# Patient Record
Sex: FEMALE | Race: WHITE | NOT HISPANIC OR LATINO | Employment: UNEMPLOYED | URBAN - METROPOLITAN AREA
[De-identification: names, ages, dates, MRNs, and addresses within clinical notes are randomized per-mention and may not be internally consistent; named-entity substitution may affect disease eponyms.]

---

## 2017-01-02 ENCOUNTER — OFFICE VISIT (OUTPATIENT)
Dept: FAMILY MEDICINE | Facility: CLINIC | Age: 32
End: 2017-01-02
Payer: COMMERCIAL

## 2017-01-02 VITALS
WEIGHT: 146 LBS | BODY MASS INDEX: 21.62 KG/M2 | HEART RATE: 86 BPM | RESPIRATION RATE: 16 BRPM | SYSTOLIC BLOOD PRESSURE: 104 MMHG | HEIGHT: 69 IN | DIASTOLIC BLOOD PRESSURE: 69 MMHG | TEMPERATURE: 97.8 F

## 2017-01-02 DIAGNOSIS — Z00.00 ROUTINE GENERAL MEDICAL EXAMINATION AT A HEALTH CARE FACILITY: Primary | ICD-10-CM

## 2017-01-02 LAB
ALBUMIN SERPL-MCNC: 3.9 G/DL (ref 3.4–5)
ALP SERPL-CCNC: 45 U/L (ref 40–150)
ALT SERPL W P-5'-P-CCNC: 23 U/L (ref 0–50)
ANION GAP SERPL CALCULATED.3IONS-SCNC: 5 MMOL/L (ref 3–14)
AST SERPL W P-5'-P-CCNC: 11 U/L (ref 0–45)
BASOPHILS # BLD AUTO: 0 10E9/L (ref 0–0.2)
BASOPHILS NFR BLD AUTO: 0.3 %
BILIRUB SERPL-MCNC: 0.7 MG/DL (ref 0.2–1.3)
BUN SERPL-MCNC: 14 MG/DL (ref 7–30)
CALCIUM SERPL-MCNC: 8.5 MG/DL (ref 8.5–10.1)
CHLORIDE SERPL-SCNC: 108 MMOL/L (ref 94–109)
CO2 SERPL-SCNC: 28 MMOL/L (ref 20–32)
CREAT SERPL-MCNC: 0.9 MG/DL (ref 0.52–1.04)
DIFFERENTIAL METHOD BLD: ABNORMAL
EOSINOPHIL # BLD AUTO: 0.1 10E9/L (ref 0–0.7)
EOSINOPHIL NFR BLD AUTO: 2.8 %
ERYTHROCYTE [DISTWIDTH] IN BLOOD BY AUTOMATED COUNT: 13.2 % (ref 10–15)
GFR SERPL CREATININE-BSD FRML MDRD: 72 ML/MIN/1.7M2
GLUCOSE SERPL-MCNC: 91 MG/DL (ref 70–99)
HCT VFR BLD AUTO: 37.3 % (ref 35–47)
HGB BLD-MCNC: 12.3 G/DL (ref 11.7–15.7)
LYMPHOCYTES # BLD AUTO: 0.9 10E9/L (ref 0.8–5.3)
LYMPHOCYTES NFR BLD AUTO: 26.5 %
MCH RBC QN AUTO: 28.9 PG (ref 26.5–33)
MCHC RBC AUTO-ENTMCNC: 33 G/DL (ref 31.5–36.5)
MCV RBC AUTO: 88 FL (ref 78–100)
MONOCYTES # BLD AUTO: 0.3 10E9/L (ref 0–1.3)
MONOCYTES NFR BLD AUTO: 9.4 %
NEUTROPHILS # BLD AUTO: 2.1 10E9/L (ref 1.6–8.3)
NEUTROPHILS NFR BLD AUTO: 61 %
PLATELET # BLD AUTO: 127 10E9/L (ref 150–450)
POTASSIUM SERPL-SCNC: 4 MMOL/L (ref 3.4–5.3)
PROT SERPL-MCNC: 6.9 G/DL (ref 6.8–8.8)
RBC # BLD AUTO: 4.26 10E12/L (ref 3.8–5.2)
SODIUM SERPL-SCNC: 141 MMOL/L (ref 133–144)
WBC # BLD AUTO: 3.5 10E9/L (ref 4–11)

## 2017-01-02 PROCEDURE — 80053 COMPREHEN METABOLIC PANEL: CPT | Performed by: FAMILY MEDICINE

## 2017-01-02 PROCEDURE — 87624 HPV HI-RISK TYP POOLED RSLT: CPT | Mod: 90 | Performed by: FAMILY MEDICINE

## 2017-01-02 PROCEDURE — 36415 COLL VENOUS BLD VENIPUNCTURE: CPT | Performed by: FAMILY MEDICINE

## 2017-01-02 PROCEDURE — 99000 SPECIMEN HANDLING OFFICE-LAB: CPT | Performed by: FAMILY MEDICINE

## 2017-01-02 PROCEDURE — 85025 COMPLETE CBC W/AUTO DIFF WBC: CPT | Performed by: FAMILY MEDICINE

## 2017-01-02 PROCEDURE — 99395 PREV VISIT EST AGE 18-39: CPT | Performed by: FAMILY MEDICINE

## 2017-01-02 PROCEDURE — G0145 SCR C/V CYTO,THINLAYER,RESCR: HCPCS | Mod: 90 | Performed by: FAMILY MEDICINE

## 2017-01-02 NOTE — PROGRESS NOTES
SUBJECTIVE:     CC: Lin Knight is an 31 year old woman who presents for preventive health visit.     Healthy Habits:    Do you get at least three servings of calcium containing foods daily (dairy, green leafy vegetables, etc.)? yes    Amount of exercise or daily activities, outside of work: 1 day(s) per week    Problems taking medications regularly not applicable    Medication side effects: No    Have you had an eye exam in the past two years? yes    Do you see a dentist twice per year? 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    No results for input(s): CHOL, HDL, LDL, TRIG, CHOLHDLRATIO, NHDL in the last 58433 hours.    Reviewed orders with patient.  Reviewed health maintenance and updated orders accordingly - Yes    Mammo Decision Support:  Mammogram not appropriate for this patient based on age.    Pertinent mammograms are reviewed under the imaging tab.  History of abnormal Pap smear: NO - age 30- 65 PAP every 3 years recommended  All Histories reviewed and updated in Epic.      ROS:  C: NEGATIVE for fever, chills, change in weight  I: NEGATIVE for worrisome rashes, moles or lesions  E: NEGATIVE for vision changes or irritation  ENT: NEGATIVE for ear, mouth and throat problems  R: NEGATIVE for significant cough or SOB  B: NEGATIVE for masses, tenderness or discharge  CV: NEGATIVE for chest  "pain, palpitations or peripheral edema  GI: NEGATIVE for nausea, abdominal pain, heartburn, or change in bowel habits  : NEGATIVE for unusual urinary or vaginal symptoms. Periods are regular.  M: NEGATIVE for significant arthralgias or myalgia  N: NEGATIVE for weakness, dizziness or paresthesias  P: NEGATIVE for changes in mood or affect    Problem list, Medication list, Allergies, and Medical/Social/Surgical histories reviewed in [facility] and updated as appropriate.  OBJECTIVE:     /69 mmHg  Pulse 86  Temp(Src) 97.8  F (36.6  C) (Oral)  Resp 16  Ht 5' 9\" (1.753 m)  Wt 146 lb (66.225 kg)  BMI 21.55 kg/m2  Breastfeeding? No  EXAM:  GENERAL: healthy, alert and no distress  EYES: Eyes grossly normal to inspection, PERRL and conjunctivae and sclerae normal  HENT: ear canals and TM's normal, nose and mouth without ulcers or lesions  NECK: no adenopathy, no asymmetry, masses, or scars and thyroid normal to palpation  RESP: lungs clear to auscultation - no rales, rhonchi or wheezes  BREAST: normal without masses, tenderness or nipple discharge and no palpable axillary masses or adenopathy  CV: regular rate and rhythm, normal S1 S2, no S3 or S4, no murmur, click or rub, no peripheral edema and peripheral pulses strong  ABDOMEN: soft, nontender, no hepatosplenomegaly, no masses and bowel sounds normal   (female): normal female external genitalia, normal urethral meatus , vaginal mucosa pink, moist, well rugated, vaginal discharge - bloody (patient on her period) and normal cervix, adnexae, and uterus without masses.  MS: no gross musculoskeletal defects noted, no edema  SKIN: no suspicious lesions or rashes  NEURO: Normal strength and tone, mentation intact and speech normal  PSYCH: mentation appears normal, affect normal/bright    ASSESSMENT/PLAN:     1. Routine general medical examination at a health care facility  - pap done today.   - CBC with platelets differential  - Comprehensive metabolic panel  - Pap " "imaged thin layer screen with HPV - recommended age 30 - 65 years (select HPV order below)    COUNSELING:   Reviewed preventive health counseling, as reflected in patient instructions       Regular exercise       Healthy diet/nutrition         reports that she has never smoked. She has never used smokeless tobacco.    Estimated body mass index is 21.55 kg/(m^2) as calculated from the following:    Height as of this encounter: 5' 9\" (1.753 m).    Weight as of this encounter: 146 lb (66.225 kg).       Counseling Resources:  ATP IV Guidelines  Pooled Cohorts Equation Calculator  Breast Cancer Risk Calculator  FRAX Risk Assessment  ICSI Preventive Guidelines  Dietary Guidelines for Americans, 2010  USDA's MyPlate  ASA Prophylaxis  Lung CA Screening    Barbara Díaz MD  [facility]  "

## 2017-01-02 NOTE — MR AVS SNAPSHOT
After Visit Summary   1/2/2017    Lin Knight    MRN: [id number]           Patient Information     Date Of Birth          1985        Visit Information        Provider Department      1/2/2017 8:00 AM Barbara Díaz MD [facility]        Today's Diagnoses     Routine general medical examination at a health care facility    -  1       Care Instructions      Preventive Health Recommendations  Female Ages 26 - 39  Yearly exam:   See your health care provider every year in order to    Review health changes.     Discuss preventive care.      Review your medicines if you your doctor has prescribed any.    Until age 30: Get a Pap test every three years (more often if you have had an abnormal result).    After age 30: Talk to your doctor about whether you should have a Pap test every 3 years or have a Pap test with HPV screening every 5 years.   You do not need a Pap test if your uterus was removed (hysterectomy) and you have not had cancer.  You should be tested each year for STDs (sexually transmitted diseases), if you're at risk.   Talk to your provider about how often to have your cholesterol checked.  If you are at risk for diabetes, you should have a diabetes test (fasting glucose).  Shots: Get a flu shot each year. Get a tetanus shot every 10 years.   Nutrition:     Eat at least 5 servings of fruits and vegetables each day.    Eat whole-grain bread, whole-wheat pasta and brown rice instead of white grains and rice.    Talk to your provider about Calcium and Vitamin D.     Lifestyle    Exercise at least 150 minutes a week (30 minutes a day, 5 days of the week). This will help you control your weight and prevent disease.    Limit alcohol to one drink per day.    No smoking.     Wear sunscreen to prevent skin cancer.    See your dentist every six months for an exam and cleaning.            Follow-ups after your visit        Your next 10 appointments already  "scheduled     2017  3:30 PM   SHORT with Darius Mckeon MD   [facility]    [address] [address] [phone]              Who to contact     If you have questions or need follow up information about today's clinic visit or your schedule please contact [facility] directly at [phone].  Normal or non-critical lab and imaging results will be communicated to you by MyChart, letter or phone within 4 business days after the clinic has received the results. If you do not hear from us within 7 days, please contact the clinic through MyChart or phone. If you have a critical or abnormal lab result, we will notify you by phone as soon as possible.  Submit refill requests through [employer] or call your pharmacy and they will forward the refill request to us. Please allow 3 business days for your refill to be completed.          Additional Information About Your Visit        [employer] Information     [employer] lets you send messages to your doctor, view your test results, renew your prescriptions, schedule appointments and more. To sign up, go to [email or web address] . Click on \"Log in\" on the left side of the screen, which will take you to the Welcome page. Then click on \"Sign up Now\" on the right side of the page.     You will be asked to enter the access code listed below, as well as some personal information. Please follow the directions to create your username and password.     Your access code is: [email or web address]  Expires: 2017  8:44 AM     Your access code will  in 90 days. If you need help or a new code, please call your [facility] or [phone].        Your Vitals Were     Pulse Temperature Respirations Height BMI (Body Mass Index) Breastfeeding?    86 97.8  F (36.6  C) (Oral) 16 5' 9\" (1.753 m) 21.55 kg/m2 No       Blood Pressure from Last 3 Encounters:   17 104/69   14 " 101/55   03/12/13 91/60    Weight from Last 3 Encounters:   01/02/17 146 lb (66.225 kg)   06/05/14 141 lb 1.6 oz (64.003 kg)   03/12/13 143 lb (64.864 kg)              We Performed the Following     CBC with platelets differential     Comprehensive metabolic panel        Primary Care Provider    None Specified       No primary provider on file.        Thank you!     Thank you for choosing [facility]  for your care. Our goal is always to provide you with excellent care. Hearing back from our patients is one way we can continue to improve our services. Please take a few minutes to complete the written survey that you may receive in the mail after your visit with us. Thank you!             Your Updated Medication List - Protect others around you: Learn how to safely use, store and throw away your medicines at www.disposemymeds.org.      Notice  As of 1/2/2017  8:44 AM    You have not been prescribed any medications.

## 2017-01-02 NOTE — Clinical Note
[facility]  [address] [place], [place], [address]  [phone]      January 5, 2017    Lin Knight  [address]   [place] [address]          Dear Lin,    The results of your recent tests were normal.All your labs from recent visit are within normal limits.  It was a pleasure to serve you.         All the best in [place]      If you have any questions or concerns, please call myself or my nurse at [phone].          Sincerely,    Barbara Díaz MD

## 2017-01-02 NOTE — Clinical Note
[facility]  [address] [address]  [phone]      January 9, 2017    Lin Knight  [address]   [place] [facility] [address]    Dear Lin,  We are happy to inform you that your PAP smear result from 01/02/17 is normal.  We are now able to do a follow up test on PAP smears. The DNA test is for HPV (Human Papilloma Virus). Cervical cancer is closely linked with certain types of HPV. Your result showed no evidence of high risk HPV.  Therefore we recommend you return in 3 years for your next pap smear.  You will still need to return to the clinic every year for an annual exam and other preventive tests.  Please contact the clinic with any questions.  Sincerely,  Barbara Díaz MD/[facility]

## 2017-01-04 LAB
COPATH REPORT: NORMAL
PAP: NORMAL

## 2017-01-05 NOTE — PROGRESS NOTES
Quick Note:    Please send patient a letter to let them know results are normal.    All your labs from recent visit are within normal limits.   It was a pleasure to serve you.   All the best in [place].     Sincerely,    Dr. Vigil  ______

## 2017-01-06 LAB
FINAL DIAGNOSIS: NORMAL
HPV HR 12 DNA CVX QL NAA+PROBE: NEGATIVE
HPV16 DNA SPEC QL NAA+PROBE: NEGATIVE
HPV18 DNA SPEC QL NAA+PROBE: NEGATIVE
SPECIMEN DESCRIPTION: NORMAL

## 2017-06-02 NOTE — NURSING NOTE
"Chief Complaint   Patient presents with     Physical     PE/PAP---pt is fasting this AM     Initial /69 mmHg  Pulse 86  Temp(Src) 97.8  F (36.6  C) (Oral)  Resp 16  Ht 5' 9\" (1.753 m)  Wt 146 lb (66.225 kg)  BMI 21.55 kg/m2  Breastfeeding? No Estimated body mass index is 21.55 kg/(m^2) as calculated from the following:    Height as of this encounter: 5' 9\" (1.753 m).    Weight as of this encounter: 146 lb (66.225 kg)..  BP completed using cuff size regular.            Devika Aly/JOON    " ineffective pain medication

## 2018-09-10 ENCOUNTER — PRENATAL OFFICE VISIT - HEALTHEAST (OUTPATIENT)
Dept: MIDWIFE SERVICES | Facility: CLINIC | Age: 33
End: 2018-09-10

## 2018-09-10 DIAGNOSIS — O09.30 LATE PRENATAL CARE AFFECTING PREGNANCY, ANTEPARTUM: ICD-10-CM

## 2018-09-10 DIAGNOSIS — Z34.83 ENCOUNTER FOR SUPERVISION OF OTHER NORMAL PREGNANCY IN THIRD TRIMESTER: ICD-10-CM

## 2018-09-10 LAB
ALBUMIN UR-MCNC: NEGATIVE MG/DL
APPEARANCE UR: CLEAR
BASOPHILS # BLD AUTO: 0 THOU/UL (ref 0–0.2)
BASOPHILS NFR BLD AUTO: 0 % (ref 0–2)
BILIRUB UR QL STRIP: NEGATIVE
COLOR UR AUTO: YELLOW
EOSINOPHIL # BLD AUTO: 0 THOU/UL (ref 0–0.4)
EOSINOPHIL NFR BLD AUTO: 1 % (ref 0–6)
ERYTHROCYTE [DISTWIDTH] IN BLOOD BY AUTOMATED COUNT: 14.3 % (ref 11–14.5)
FASTING STATUS PATIENT QL REPORTED: NO
GLUCOSE 1H P 50 G GLC PO SERPL-MCNC: 75 MG/DL (ref 70–139)
GLUCOSE UR STRIP-MCNC: NEGATIVE MG/DL
HCT VFR BLD AUTO: 35.2 % (ref 35–47)
HGB BLD-MCNC: 11.5 G/DL (ref 12–16)
HGB UR QL STRIP: NEGATIVE
HIV 1+2 AB+HIV1 P24 AG SERPL QL IA: NEGATIVE
KETONES UR STRIP-MCNC: NEGATIVE MG/DL
LEUKOCYTE ESTERASE UR QL STRIP: NEGATIVE
LYMPHOCYTES # BLD AUTO: 0.9 THOU/UL (ref 0.8–4.4)
LYMPHOCYTES NFR BLD AUTO: 15 % (ref 20–40)
MCH RBC QN AUTO: 29.5 PG (ref 27–34)
MCHC RBC AUTO-ENTMCNC: 32.7 G/DL (ref 32–36)
MCV RBC AUTO: 90 FL (ref 80–100)
MONOCYTES # BLD AUTO: 0.4 THOU/UL (ref 0–0.9)
MONOCYTES NFR BLD AUTO: 6 % (ref 2–10)
NEUTROPHILS # BLD AUTO: 4.5 THOU/UL (ref 2–7.7)
NEUTROPHILS NFR BLD AUTO: 77 % (ref 50–70)
NITRATE UR QL: NEGATIVE
PH UR STRIP: 7 [PH] (ref 5–8)
PLATELET # BLD AUTO: 140 THOU/UL (ref 140–440)
PMV BLD AUTO: 12.3 FL (ref 8.5–12.5)
RBC # BLD AUTO: 3.9 MILL/UL (ref 3.8–5.4)
SP GR UR STRIP: 1.01 (ref 1–1.03)
UROBILINOGEN UR STRIP-ACNC: NORMAL
WBC: 5.8 THOU/UL (ref 4–11)

## 2018-09-10 ASSESSMENT — MIFFLIN-ST. JEOR: SCORE: 1408.23

## 2018-09-11 LAB
ABO/RH(D): NORMAL
ABORH REPEAT: NORMAL
ANTIBODY SCREEN: NEGATIVE
BACTERIA SPEC CULT: NORMAL
COLLECTION METHOD: NORMAL
HBV SURFACE AG SERPL QL IA: NEGATIVE
HCV AB SERPL QL IA: NEGATIVE
LEAD BLD-MCNC: NORMAL UG/DL
LEAD RETEST: NO
RUBV IGG SERPL QL IA: POSITIVE
T PALLIDUM AB SER QL: NEGATIVE

## 2018-09-12 LAB
GUARDIAN FIRST NAME: NORMAL
GUARDIAN LAST NAME: NORMAL
HEALTH CARE PROVIDER CITY: NORMAL
HEALTH CARE PROVIDER NAME: NORMAL
HEALTH CARE PROVIDER PHONE: NORMAL
HEALTH CARE PROVIDER STATE: NORMAL
HEALTH CARE PROVIDER STREET ADDRESS: NORMAL
HEALTH CARE PROVIDER ZIP CODE: NORMAL
LEAD, B: <1 MCG/DL (ref 0–4.9)
PATIENT CITY: NORMAL
PATIENT COUNTY: NORMAL
PATIENT EMPLOYER: NORMAL
PATIENT ETHNICITY: NORMAL
PATIENT HOME PHONE: NORMAL
PATIENT OCCUPATION: NORMAL
PATIENT RACE: NORMAL
PATIENT STATE: NORMAL
PATIENT STREET ADDRESS: NORMAL
PATIENT ZIP CODE: NORMAL
SUBMITTING LABORATORY PHONE: NORMAL
VENOUS/CAPILLARY: NORMAL

## 2018-09-14 ENCOUNTER — HOSPITAL ENCOUNTER (OUTPATIENT)
Dept: ULTRASOUND IMAGING | Facility: CLINIC | Age: 33
Discharge: HOME | End: 2018-09-14
Attending: MIDWIFE

## 2018-09-14 DIAGNOSIS — Z34.83 ENCOUNTER FOR SUPERVISION OF OTHER NORMAL PREGNANCY IN THIRD TRIMESTER: ICD-10-CM

## 2018-09-24 ENCOUNTER — AMBULATORY - HEALTHEAST (OUTPATIENT)
Dept: MIDWIFE SERVICES | Facility: CLINIC | Age: 33
End: 2018-09-24

## 2018-09-24 ENCOUNTER — PRENATAL OFFICE VISIT - HEALTHEAST (OUTPATIENT)
Dept: MIDWIFE SERVICES | Facility: CLINIC | Age: 33
End: 2018-09-24

## 2018-09-24 DIAGNOSIS — Z67.91 RH NEGATIVE, ANTEPARTUM: ICD-10-CM

## 2018-09-24 DIAGNOSIS — O26.899 RH NEGATIVE, ANTEPARTUM: ICD-10-CM

## 2018-09-24 DIAGNOSIS — Z34.80 ENCOUNTER FOR SUPERVISION OF OTHER NORMAL PREGNANCY: ICD-10-CM

## 2018-09-24 ASSESSMENT — MIFFLIN-ST. JEOR: SCORE: 1412.76

## 2018-09-25 LAB — ANTIBODY SCREEN: NEGATIVE

## 2018-10-22 ENCOUNTER — PRENATAL OFFICE VISIT - HEALTHEAST (OUTPATIENT)
Dept: MIDWIFE SERVICES | Facility: CLINIC | Age: 33
End: 2018-10-22

## 2018-10-22 ENCOUNTER — COMMUNICATION - HEALTHEAST (OUTPATIENT)
Dept: MIDWIFE SERVICES | Facility: CLINIC | Age: 33
End: 2018-10-22

## 2018-10-22 DIAGNOSIS — O26.899 RH NEGATIVE, ANTEPARTUM: ICD-10-CM

## 2018-10-22 DIAGNOSIS — Z67.91 RH NEGATIVE, ANTEPARTUM: ICD-10-CM

## 2018-10-22 DIAGNOSIS — O09.30 LATE PRENATAL CARE AFFECTING PREGNANCY, ANTEPARTUM: ICD-10-CM

## 2018-10-22 DIAGNOSIS — Z34.80 SUPERVISION OF OTHER NORMAL PREGNANCY, ANTEPARTUM: ICD-10-CM

## 2018-10-22 ASSESSMENT — MIFFLIN-ST. JEOR: SCORE: 1439.98

## 2018-11-05 ENCOUNTER — PRENATAL OFFICE VISIT - HEALTHEAST (OUTPATIENT)
Dept: MIDWIFE SERVICES | Facility: CLINIC | Age: 33
End: 2018-11-05

## 2018-11-05 DIAGNOSIS — O09.30 LATE PRENATAL CARE AFFECTING PREGNANCY, ANTEPARTUM: ICD-10-CM

## 2018-11-05 DIAGNOSIS — Z67.91 RH NEGATIVE, ANTEPARTUM: ICD-10-CM

## 2018-11-05 DIAGNOSIS — O26.899 RH NEGATIVE, ANTEPARTUM: ICD-10-CM

## 2018-11-05 DIAGNOSIS — Z34.83 ENCOUNTER FOR SUPERVISION OF OTHER NORMAL PREGNANCY, THIRD TRIMESTER: ICD-10-CM

## 2018-11-05 DIAGNOSIS — H61.21 IMPACTED CERUMEN OF RIGHT EAR: ICD-10-CM

## 2018-11-05 ASSESSMENT — MIFFLIN-ST. JEOR: SCORE: 1444.52

## 2018-11-07 LAB
ALLERGIC TO PENICILLIN: YES
GP B STREP DNA SPEC QL NAA+PROBE: NEGATIVE

## 2018-11-12 ENCOUNTER — PRENATAL OFFICE VISIT - HEALTHEAST (OUTPATIENT)
Dept: MIDWIFE SERVICES | Facility: CLINIC | Age: 33
End: 2018-11-12

## 2018-11-12 DIAGNOSIS — O09.529 SUPERVISION OF HIGH-RISK PREGNANCY OF ELDERLY MULTIGRAVIDA: ICD-10-CM

## 2018-11-12 DIAGNOSIS — O99.013 ANEMIA AFFECTING PREGNANCY IN THIRD TRIMESTER: ICD-10-CM

## 2018-11-12 LAB — HGB BLD-MCNC: 10.2 G/DL (ref 12–16)

## 2018-11-12 ASSESSMENT — MIFFLIN-ST. JEOR: SCORE: 1449.05

## 2018-11-13 ENCOUNTER — COMMUNICATION - HEALTHEAST (OUTPATIENT)
Dept: MIDWIFE SERVICES | Facility: CLINIC | Age: 33
End: 2018-11-13

## 2018-11-19 ENCOUNTER — PRENATAL OFFICE VISIT - HEALTHEAST (OUTPATIENT)
Dept: MIDWIFE SERVICES | Facility: CLINIC | Age: 33
End: 2018-11-19

## 2018-11-19 DIAGNOSIS — O26.899 RH NEGATIVE, ANTEPARTUM: ICD-10-CM

## 2018-11-19 DIAGNOSIS — Z67.91 RH NEGATIVE, ANTEPARTUM: ICD-10-CM

## 2018-11-19 DIAGNOSIS — Z34.80 SUPERVISION OF OTHER NORMAL PREGNANCY, ANTEPARTUM: ICD-10-CM

## 2018-11-19 DIAGNOSIS — O09.30 LATE PRENATAL CARE AFFECTING PREGNANCY, ANTEPARTUM: ICD-10-CM

## 2018-11-19 ASSESSMENT — MIFFLIN-ST. JEOR: SCORE: 1444.52

## 2018-11-26 ENCOUNTER — PRENATAL OFFICE VISIT - HEALTHEAST (OUTPATIENT)
Dept: MIDWIFE SERVICES | Facility: CLINIC | Age: 33
End: 2018-11-26

## 2018-11-26 DIAGNOSIS — Z34.80 SUPERVISION OF OTHER NORMAL PREGNANCY, ANTEPARTUM: ICD-10-CM

## 2018-11-26 ASSESSMENT — MIFFLIN-ST. JEOR: SCORE: 1453.59

## 2018-12-03 ENCOUNTER — PRENATAL OFFICE VISIT - HEALTHEAST (OUTPATIENT)
Dept: MIDWIFE SERVICES | Facility: CLINIC | Age: 33
End: 2018-12-03

## 2018-12-03 DIAGNOSIS — Z34.80 SUPERVISION OF OTHER NORMAL PREGNANCY, ANTEPARTUM: ICD-10-CM

## 2018-12-03 ASSESSMENT — MIFFLIN-ST. JEOR: SCORE: 1453.59

## 2019-01-21 ENCOUNTER — OFFICE VISIT - HEALTHEAST (OUTPATIENT)
Dept: MIDWIFE SERVICES | Facility: CLINIC | Age: 34
End: 2019-01-21

## 2019-01-21 ASSESSMENT — MIFFLIN-ST. JEOR: SCORE: 1400.29

## 2019-02-07 ENCOUNTER — COMMUNICATION - HEALTHEAST (OUTPATIENT)
Dept: MIDWIFE SERVICES | Facility: CLINIC | Age: 34
End: 2019-02-07

## 2019-04-29 ENCOUNTER — OFFICE VISIT - HEALTHEAST (OUTPATIENT)
Dept: MIDWIFE SERVICES | Facility: CLINIC | Age: 34
End: 2019-04-29

## 2019-04-29 DIAGNOSIS — R63.4 UNEXPLAINED WEIGHT LOSS: ICD-10-CM

## 2019-04-29 DIAGNOSIS — Z01.419 WELL WOMAN EXAM WITH ROUTINE GYNECOLOGICAL EXAM: ICD-10-CM

## 2019-04-29 ASSESSMENT — MIFFLIN-ST. JEOR: SCORE: 1443.95

## 2019-07-10 ENCOUNTER — TELEPHONE (OUTPATIENT)
Dept: SCHEDULING | Facility: CLINIC | Age: 34
End: 2019-07-10

## 2020-06-25 ENCOUNTER — COMMUNICATION - HEALTHEAST (OUTPATIENT)
Dept: MIDWIFE SERVICES | Facility: CLINIC | Age: 35
End: 2020-06-25

## 2021-05-28 ENCOUNTER — COMMUNICATION - HEALTHEAST (OUTPATIENT)
Dept: MIDWIFE SERVICES | Facility: CLINIC | Age: 36
End: 2021-05-28

## 2021-05-28 NOTE — PROGRESS NOTES
Annual Health Maintenance Exam    Subjective:     Lin is a 33 y.o. female who presents for an annual exam.  She is an established patient to the [facility]. She is a . Her son Mati is 5 months old.  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  They will likely stay there for 2 more years.  She reports no concerns since her last visit which was 6 weeks postpartum.  She is breastfeeding exclusively, and has not had a return of her menses since delivery.  She is using NFP for birth control purposes.  She is unsure about future children/pregnancies.  She is due for pap smear screening today, however has many questions about HPV testing, and feels she does not need the testing for HPV as she is low risk and has not had any change in partners. She is currently in a monogamous relationship with her .  She has never had a history of abnormal pap smears, and reports no history of STI's.  She would like to have only cervical cytology performed today, and agrees to have HPV testing occur if cytology is abnormal.   She is exercising 1-2 times a week, walking with her children.  She does report a history of unexplained weight loss of over 30 lbs when living in [place] [place].  She is concerned that her thyroid is causing this.  She notes that the past 9 months have not been an issue as she was pregnant and postpartum.  She has not noted the weight loss issues while living in the [place], only when living in [place].  No other family members have this issue.  Denies changes in stool/BM habits or consistency, no abdominal pain or nausea/vomiting.    Allergies  Patient has no known allergies.    Current Medications  Current Outpatient Medications   Medication Sig Dispense Refill     prenatal 21/iron fu/folic acid (PRENATAL COMPLETE ORAL) Take by mouth.       No current facility-administered medications for this visit.        Past Medical/Surgical  History  Past Medical History:   Diagnosis Date     Urinary tract infection     no UTI for 1 year     Varicella     childhood illness     Past Surgical History:   Procedure Laterality Date     WISDOM TOOTH EXTRACTION         Obstetric and Gynecologic History  Patient's last menstrual period was 2018.    OB History    Para Term  AB Living   4 4 4     5   SAB TAB Ectopic Multiple Live Births         1 5      # Outcome Date GA Lbr Jackson/2nd Weight Sex Delivery Anes PTL Lv   4 Term 18 40w1d 02:15 9 lb 1.3 oz (4.12 kg) M Water Birth Local N ANNIE   3 Term 11/17/15 40w0d  7 lb 4 oz (3.289 kg) M Vag-Spont None N ANNIE   2 Term 08/10/11 39w5d   F Vag-Spont None N ANNIE      Birth Comments: attempted waterbirth, but had thin mec   1A Term 09 38w0d  6 lb 7 oz (2.92 kg) M Vag-Spont None N ANNIE   1B Term 09 38w0d  6 lb (2.722 kg) M Vag-Spont None N ANNIE       Last Pap: 2013.  Results were: normal  Last mammogram: NA    Immunization History  Status updated.    Family History  Family History   Problem Relation Age of Onset     No Medical Problems Daughter      No Medical Problems Son      Cancer Maternal Grandmother 70        brain CA     Arthritis Paternal Grandmother      Cancer Paternal Grandfather 70        leukemia     No Medical Problems Son      No Medical Problems Son        Health Maintenance  Diet:  general  Regular Exercise: Yes.  walking.    Caffeine use:  yes  Sunscreen Use: Yes.   Dental Visits Regularly: Yes  Seat Belt: Yes  Self Breast Exam Monthly:Yes  Abuse History:  Does not have a history of abuse.  Currently does feel safe in all her relationships.      Social History  Social History     Socioeconomic History     Marital status:      Spouse name: Lamont     Number of children: 4     Years of education: College Grad     Highest education level: Not on file   Occupational History     Occupation: Stay at Home Mom   Social Needs     Financial resource strain: Not on file      "Food insecurity:     Worry: Not on file     Inability: Not on file     Transportation needs:     Medical: Not on file     Non-medical: Not on file   Tobacco Use     Smoking status: Never Smoker     Smokeless tobacco: Never Used   Substance and Sexual Activity     Alcohol use: No     Frequency: Never     Comment: none with pregnancy     Drug use: No     Sexual activity: Yes     Partners: Male   Lifestyle     Physical activity:     Days per week: Not on file     Minutes per session: Not on file     Stress: Not on file   Relationships     Social connections:     Talks on phone: Not on file     Gets together: Not on file     Attends Mosque service: Not on file     Active member of club or organization: Not on file     Attends meetings of clubs or organizations: Not on file     Relationship status: Not on file     Intimate partner violence:     Fear of current or ex partner: Not on file     Emotionally abused: Not on file     Physically abused: Not on file     Forced sexual activity: Not on file   Other Topics Concern     Not on file   Social History Narrative     Not on file       Further Screening History  Colonoscopy: NA.  Lipid Profile: not collected but currently breastfeeding.  Glucose Screen: in pregnancy, WNL.  Last Dexa: NA.    Review of Systems  A 12 point comprehensive review of systems was negative except as noted.    Objective:         Vitals:    04/29/19 1058   BP: 90/60   Pulse: 76   Weight: 154 lb (69.9 kg)   Height: 5' 8.75\" (1.746 m)       Physical Exam:  General: Pleasant, articulate, well-groomed, well-nourished female.  Not in any apparent distress.  Neck: Supple.  Thyroid: Small, symmetrical, no nodules noted.  Lymphadenopathy: Negative.  Lungs: Clear to auscultation bilaterally, and a nonlabored breathing pattern.  Cardio: Regular rate and rhythm, negative for murmur.   Breasts: Symmetrical, nontender, no masses, negative lymphadenopathy, negative nipple discharge.  Lactating breasts " noted.  Abdomen: Soft, nontender, no masses, negative CVAT.  External genitalia: Normal hair distribution, no lesions.  Urethral opening: Without lesions, or tenderness.   Bladder: Without masses, or tenderness.  Vagina: Pink, rugated, normal-appearing discharge.  Cervix: posterior, pink, smooth, no lesions.  Pap smear was obtained, however HPV not reflexed unless abnormal.  Bimanual: Finds uterus small anteverted, mobile, nontender, no masses.  Negative CMT.  Adnexa: without masses or tenderness.    Lower extremities: +1 reflexes, no significant edema.      Assessment / Plan:     1) Annual health maintenance exam generally within normal limits today.  Pap smear collected.  2) History of unexplained [place] while [place]  3) Declines recommended HPV testing with pap smear.    1. Discussed nutrition and exercise.  Advised MVI, Vitamin D3 4000IU geltab and an omega 3 supplement daily.  Accepts the following  labs: TSH (future lab placed).   Breast self exam technique reviewed and patient encouraged to perform self-exam monthly. Contraception: NFP.  Next pap due 3 years due to declining of HPV co-testing.   2. Discussed at length causes of weight gain and loss, particularly environmental factors while living abroad. Offered to draw baseline TSH for patient if she desires, she will check with insurance and determine if her coverage will pay for the test. Orders placed for future lab if she decides to have draw completed.   3.  RTC 1 year for annual physical exam, or PRN.    TT = 40 minutes of time of which >50% on education/counseling/care-coordination

## 2021-06-01 VITALS — WEIGHT: 147 LBS | HEIGHT: 69 IN | BODY MASS INDEX: 21.77 KG/M2

## 2021-06-01 VITALS — BODY MASS INDEX: 22.81 KG/M2 | WEIGHT: 154 LBS | HEIGHT: 69 IN

## 2021-06-01 VITALS — HEIGHT: 69 IN | WEIGHT: 148 LBS | BODY MASS INDEX: 21.92 KG/M2

## 2021-06-02 ENCOUNTER — RECORDS - HEALTHEAST (OUTPATIENT)
Dept: ADMINISTRATIVE | Facility: CLINIC | Age: 36
End: 2021-06-02

## 2021-06-02 VITALS — BODY MASS INDEX: 22.96 KG/M2 | HEIGHT: 69 IN | WEIGHT: 155 LBS

## 2021-06-02 VITALS — HEIGHT: 68 IN | BODY MASS INDEX: 22.28 KG/M2 | WEIGHT: 147 LBS

## 2021-06-02 VITALS — HEIGHT: 69 IN | BODY MASS INDEX: 23.25 KG/M2 | WEIGHT: 157 LBS

## 2021-06-02 VITALS — HEIGHT: 69 IN | BODY MASS INDEX: 22.96 KG/M2 | WEIGHT: 155 LBS

## 2021-06-02 VITALS — BODY MASS INDEX: 23.25 KG/M2 | WEIGHT: 157 LBS | HEIGHT: 69 IN

## 2021-06-02 VITALS — BODY MASS INDEX: 22.81 KG/M2 | HEIGHT: 69 IN | WEIGHT: 154 LBS

## 2021-06-02 VITALS — HEIGHT: 69 IN | BODY MASS INDEX: 23.11 KG/M2 | WEIGHT: 156 LBS

## 2021-06-20 NOTE — PROGRESS NOTES
PRENATAL VISIT   FIRST OBSTETRICAL EXAM - OB   Assessment / Impression   First prenatal visit at 27w4d      Late entry to care- initiated care in [facility] x 2 visits  History of twin pregnancy  Rh negative ( documented Rh negative,  is FOB)     Plan:   -Discussed pregnancy care with late entry. Due to no records created or available from initiating care in [facility], reviewed importance of having ultrasound to view fetal screen but also placenta location.  Pt consents to repeat fetal US, encouraged to do this in the next few weeks.  Referral given.  -1 hour GCT performed today, as well as IOB labs drawn.  Will wait for 29 weeks to administer Rhogam and draw AB screen.  Discussed at length RH negative status, and recommendation for Rhogam with each pregnancy, as we cannot test baby blood type at this point.  Also discussed fact that  blood type is negative as well, however reviewed that recommendation is still to have Rhogam in pregnancy, as we cannot determine parentage or fetal blood type at this point in time.  Advised Lin that she can decline the injection, however would still recommend the AB screen.  Handouts regarding Rh negative blood type given for her to review prior to next appointment.  -IOB labs drawn.   -Pt is interested in drawing lead level.   -Patient is interested in waterbirth. HIV and Hep C drawn today.   -Reviewed prenatal care schedule.   -Optimal nutrition and weight gain discussed. Pregnancy weight gain of 25-35 lbs (BMI 18.5-24.9) encouraged.   -Anticipatory guidance for common pregnancy questions and concerns reviewed.   -Danger s/sx for this trimester reviewed with patient.  Reviewed fetal movement monitoring, as well as  labor symptoms.    -Discussed right sided SI pain related to RL and pelvic floor instability. Recommended prenatal cradle or maternity support belt to assist in taking pressure off ligaments and muscles. Discussed also strengthening  exercises for pelvic floor appropriate for pregnancy.  -Reviewed genetic screening options with patient, patient does not elect for first trimester screening. The patient does not elect for quad screening.   -Reviewed carrier testing options with patient, patient does not elect for testing or referral to genetic counseling.  -IOB packet given and reviewed with patient.   -CNM services and hospital options reviewed; emergency and scheduling phone numbers given to patient.   -Return to clinic 2 weeks.  Next visit: Tdap, Rhogam with AB screen, Flu shot  Total time spent with patient: 40 minutes, >50% time spent counseling and coordinating care.   Subjective:   Lin Knight is a 32 y.o.  here today for her first obstetrical exam at 27w4d. Here by herself. This pregnancy is planned.  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 They have lived in [place] for 4 years, and have returned for 9 months only.  She is staying in [employer] witness sponsored housing and her whole family is back here with her.  She had initiated care for this pregnancy in [facility], however there are no records kept of the care she received.  She reports having 2 visits and 2 ultrasound. She states the OB was not concerned about the pregnancy, she has had no complications thus far. She denies vaginal bleeding or uterine contractions. She feels regular fetal movement.  She has a known LMP with 38 day cycles, and her EDC agreed with her known date of conception. Predicting an expected date of delivery of Estimated Date of Delivery: 18. Last period was normal. Her pregnancy is dated by date of conception.   The patient states that she is in a mutually monogamous relationship and states that she is safe. Offered GC/CT screening today and patient declines.  She is due for pap smear, which she would like to do postpartum.   Current symptoms also include: breast tenderness and frequent  urination.  She is currently having some right RL pain and discomfort, as well as right sided SI pain in pelvic and radiates to right leg.  Risk factors: late to care  Social History:   Education level: college   Occupation: missionary   Partners name: Lamont   ?   OB History    Para Term  AB Living   4 3 3   4   SAB TAB Ectopic Multiple Live Births      1 4      # Outcome Date GA Lbr Jackson/2nd Weight Sex Delivery Anes PTL Lv   4 Current            3 Term 11/17/15 40w0d  7 lb 4 oz (3.289 kg) M Vag-Spont None N ANNIE   2 Term 08/10/11 39w5d   F Vag-Spont None N ANNIE      Birth Comments: attempted waterbirth, but had thin mec   1A Term 09 38w0d  6 lb 7 oz (2.92 kg) M Vag-Spont None N ANNIE   1B Term 09 38w0d  6 lb (2.722 kg) M Vag-Spont None N ANNIE         History:   Past Medical History:   Diagnosis Date     Urinary tract infection     no UTI for 1 year     Varicella     childhood illness      Past Surgical History:   Procedure Laterality Date     WISDOM TOOTH EXTRACTION        Family History   Problem Relation Age of Onset     No Medical Problems Daughter      No Medical Problems Son      Cancer Maternal Grandmother 70     brain CA     Arthritis Paternal Grandmother      Cancer Paternal Grandfather 70     leukemia     No Medical Problems Son      No Medical Problems Son       Social History   Substance Use Topics     Smoking status: Never Smoker     Smokeless tobacco: Never Used     Alcohol use None      Comment: none with pregnancy      Current Outpatient Prescriptions   Medication Sig Dispense Refill     prenatal 21/iron fu/folic acid (PRENATAL COMPLETE ORAL) Take by mouth.       No current facility-administered medications for this visit.       No Known Allergies   The patient's medical, surgical and family histories were reviewed and updated as noted in Epic.   Pap smear: Last Pap: 2013, Result: negative, Previous History: negative, Any history of abnormal: no. Next Due: now, patient defers  "until postpartum.       EPDS score today: 2   Review of Systems   General: Fatigue but otherwise denies problem   Eyes: Denies problem   Ears/Nose/Throat: Denies problem   Cardiovascular: Denies problem   Respiratory: Denies problem   Gastrointestinal: Nausea without vomiting, otherwise negative   Genitourinary: Denies any discharge, vaginal bleeding or itchiness or any other problem   Musculoskeletal: Breast tenderness otherwise denies problem   Skin: Denies problem   Neurologic: Denies problem   Psychiatric: Denies problem   Endocrine: Denies problem   Heme/Lymphatic: Denies problem   Allergic/Immunologic: Denies problem       Objective:   Objective    Vitals:    09/10/18 1032   BP: 94/60   Pulse: 80   Weight: 147 lb (66.7 kg)   Height: 5' 8.5\" (1.74 m)      Physical Exam:   General Appearance: Alert, cooperative, no distress, appears stated age   HEENT: Normocephalic, without obvious abnormality, atraumatic. Conjunctiva/corneas clear, does not wear corrective lenses   Neck: Supple, symmetrical, trachea midline, no adenopathy.   Thyroid: not enlarged, symmetric, no tenderness/mass/nodules   Back: Symmetric, no curvature, ROM normal, no CVA tenderness   Lungs: Clear to auscultation bilaterally, respirations unlabored   Heart: Regular rate and rhythm, S1 and S2 normal, no murmur, rub, or gallop. No edema to lower extremities.   Breasts: No breast masses, tenderness, asymmetry, or nipple discharge. Nipples are bilaterally everted.   Abdomen: Gravid, soft, non-tender, bowel sounds active all four quadrants, no masses.   FHT: +136   Pelvic exam deferred due to patient preference  Uterus: midline position, non tender, enlarged approximately 27 weeks size   Musculoskeletal: Extremities normal, atraumatic, no cyanosis   Skin: Skin color, texture, turgor normal, no rashes or lesions   Lymph nodes: Cervical, supraclavicular, and axillary nodes normal   Neurologic: Alert and oriented x 3. Normal speech   Lab:   Results for " orders placed or performed in visit on 09/10/18   Glucose,Gestational Challenge (1 Hour)   Result Value Ref Range    Glucose, Gestational Challenge 1hr 75 70 - 139 mg/dL    Patient Fasting > 8hrs? No    Urinalysis Macroscopic   Result Value Ref Range    Color, UA Yellow Colorless, Yellow, Straw, Light Yellow    Clarity, UA Clear Clear    Glucose, UA Negative Negative    Bilirubin, UA Negative Negative    Ketones, UA Negative Negative    Specific Gravity, UA 1.010 1.005 - 1.030    Blood, UA Negative Negative    pH, UA 7.0 5.0 - 8.0    Protein, UA Negative Negative mg/dL    Urobilinogen, UA 0.2 E.U./dL 0.2 E.U./dL, 1.0 E.U./dL    Nitrite, UA Negative Negative    Leukocytes, UA Negative Negative                                                                                                                            Home

## 2021-06-20 NOTE — PROGRESS NOTES
Lin is here alone.  Feeling well!  Reviewed lab results from last visit.  Also discussed Tdap, Influenza and Rhophylac administration.  Handouts and questions answered.  We discussed her Rh negative status, as well as her husbands documented blood type of Rh negative.  Reviewed the recommendation that she still receive prophylactic Rhophylac, and she declines this option currently.  Is aware of the potential risks (ABO incompatibility and antibody production if  is Rh positive and baby is Rh positive).  She states she also is certain of paternity of baby.  Advised Lin that we will provide her with the injection should she change her mind.  Additionally, we will cord blood for baby to determine baby's blood type and possible need for immunoglobulin after birth.  Lin appreciates the information. Reviewed her anatomy ultrasound.  She is considering her options for pain management in labor, and for now is open to NCB or epidural.

## 2021-06-21 NOTE — PROGRESS NOTES
Lin is here today alone for a routine prenatal visit at 37w4d. Continues to have irregular contractions but does endorses their increased length and intensity. Feeling them more in her lower back now and can feel lower fetal movement in her pelvis. She denies LOF, VB, regular contractions. Feeling a burst of energy today! Discussed with family that she may skip Thanksgiving celebrations to facilitate rest and relaxation. Hgb 10.2, results reviewed previously with patient, she has restarted her Iron supplementation. Given list of iron rich foods today. Questions about hydrocephalus today as she had a couple colds during the early parts of this pregnancy. Discussed visualization through 20w US showed normal anatomy, she is encouraged by this.

## 2021-06-21 NOTE — PROGRESS NOTES
Here with  Lamont.  Feeling some UC's, contracted a lot last night.  Feeling normal fetal movement. Has some periodic pain in front of legs from nerve pain, however this has resolved, feels baby has changed position of head in pelvis.  Denies any change in discharge.  Is ready for labor, reviewed labor s/sx, when to call CNM on call with onset of regular, intense contractions.  Has some continued right ear discomfort, but not pain.  Desires evaluation, ear canal is completely occluded by cerumen, again cleared with removal probe and able to visualize the ear canal which appears white with very light pink in TM.  Improved since last evaluation.  Lin feels huge improvement after removal of cerumen.  Discussed risks of deeper impaction with procedure, offered to have Lin return for ear wash, she will consider this.  Left ear also evaluated, the TM appears opaque and not bulging. Small amount of cerumen noted around periphery of TM, but not occluding.  Instructed on how to schedule ear wash at  if desires.  Desires breast pump, will check with insurance about where prescription needs to be sent.  Will procure at clinic at next visit.  Rx placed in chart, pt will send message with fax number for prescription to be faxed.

## 2021-06-21 NOTE — PROGRESS NOTES
"*check HGB next visit* GBS only collected today.  Present for and agree with exam and assessment.  Fundal growth is demonstrated since last visit. Discussed ultrasound to evaluate growth vs. Continued watching/monitoring.  Lin feels baby is appropriate for size, feels baby is sitting very low in pelvis. Clinical Leopolds reveals EFW 4.75-5 lbs. Due to demonstrated fundal growth and very low vertex in pelvis, will continue monitoring at next weeks visit.  Reviewed importance of fetal movement monitoring, as well as monitoring of UC's/PTL signs.     Per patient request, examination of ear canals bilaterally also performed.  Ear canal on left side reveals minimal cerumen and tympanic membrane visualized as opaque, no bulge and no bleeding.  Lin reports symptoms resolving on left side however still feels \"fullness\" of ear canal, attributes it to her pregnancy.  Right ear exam reveals cerumen obstructing visual field. Gentle removal of cerumen (colored dark brown/black) reveals tympanic membrane that is bright red with some bulging and erythema in canal noted.  Not all cerumen removed, as difficult to remove without pain.    Advised that Lin see family practice or internal med MD to complete removal of cerumen and evaluate TM, especially on right ear.  She reports that she does not have pain with the exception of loud noises, then she notes pressure.  Advised Lin that she needs evaluation of right ear for consideration of treatment. She will check with insurance to determine coverage for FP or IM provider.   "

## 2021-06-21 NOTE — PROGRESS NOTES
"Lin is here alone, feeling well. Denies any PTL s/sx, LOF or vaginal bleeding.  Normal fetal movement, feeling more pelvic pressure. Has noted more low back pain and vaginal discomfort, is using back brace for support of pelvis and abdomen, with good relief.  Feels more movement on upper right quadrant where she feels feet are located.  Signed waterbirth consent today, she is unsure that she wants this, but she is open to considering this option.     Has some left ear discharge, but notes some trouble still with it.  Would like visualized ear today, otoscope used to visualize TM which is occluded by cerumen. Cerumen gently removed to achieve better visualization and TM still difficult to visualize.  Cerumen noted to be dark brown in color and copious in quantity.  Lin notes no pain with left ear, but sound \"feels like I'm underwater\".  Denies any discharge from left ear.  Offered ear washing in clinic, which she continues to decline.  Advised a referral to PCP or ENT for further evaluation of ear if symptoms do not resolve or worsen.   Lin agrees with plan.  EPDS = 1, denies s/sx PPD and denies history of depression with any previous pregnancy.  Had ultrasound for fetal screening and cost was more because it was \"diagnostic ultrasound\" instead of \"screening ultrasound\", indication changed by insurance.  Out of pocket costs higher than anticipated, and she is asking about costs associated with birth.  Cost of care phone number provided to patient via [employer] message.   Planning to breastfeed.  Insurance will cover breast pump, encouraged Lin to discuss with insurance where she should procure pump. Reviewed options for purchase at clinic. Discussed GBS testing and hgb and position check with VE at 36 weeks.  Size slightly less than dates, will Continue to monitor FH, discussed if size less than dates at next visit, will consider growth US.  Danger s/sx for 3rd trimester reviewed, encouraged Lin to call " CNM on call with questions or concerns.  Next visit, discuss: peds provider, birth control and pre-registration.

## 2021-06-21 NOTE — PROGRESS NOTES
Lin is here today for a routine prenatal with her daughter at 35w4d. States she is feeling well overall, endorses daily fetal movement, denies LOF, VB. Endorses painful contractions that wake her from sleep over the past few days, relief after a few hours. Feeling contractions low in her pelvis and back. Vertex by Leopolds and digital exam today, 1/50/0, cervix soft, midline. Discussion related to concern for poor fetal growth, fetus sitting mostly in lower right quadrant and low station of 0. Size feels appropriate for dates by Leopolds today.  Reviewed PTL s/sx. GBS collected. Weight gain appropriate for GA today.   Exam by KARAN Smith CNM shows that right ear occluded by cerumen today, initially unable to visualize TM. Copious amounts of dark brown cerumen cleared. TM visualized as bright red following cerumen removal. Lin does endorse some intermittent hyperacusis.  Left ear canal clear with opaque TM following cerumen removal two weeks prior. Encouraged to follow up with PCP to further evaluate TM inflammation. Lin is agreeable and will check with insurance to ensure coverage for office visits.

## 2021-06-22 NOTE — PROGRESS NOTES
Lin is here with daughter for her 39w4d PNV. Some UC's. Ready for labor. Knows she could go quickly.   Taking iron supplement now, may add floridix as well. [place] [facility] [place]  [place] for breast pump given today.

## 2021-06-23 NOTE — PROGRESS NOTES
Assessment:        Lin Knight is a 33 y.o. female here for postpartum exam at 6 weeks postpartum. Pap smear not done at today's visit. Declines pap until she speaks with insurance regarding coverage.      Plan:        1. Discussed routine postpartum care including perineal care, breast care, nipple care, sibling and family adjustment, return to fertility, pregnancy risks AMA as well as options for avoiding pregnancy, routine well woman screening and follow up for pap smear recommendations.  Discussed recommendation for HPV co-testing with pap, patient would like to ensure insurance will cover this test prior to having it completed.  2. Contraception: none  3. Leaving for [facility] in May, will return to clinic prior to leaving for well woman exam and pap smear with HPV (if patient consents to HPV testing).    Subjective:       Lin Knight is a 33 y.o. female who presents for a postpartum visit. She is 6 weeks postpartum following a spontaneous vaginal delivery. I have fully reviewed the prenatal and intrapartum course. The delivery was at 40 gestational weeks. Outcome: spontaneous vaginal delivery. Postpartum course has been uncomplicated. Baby's course has been uncomplicated. Baby is feeding by breast. Bled for  4 weeks postpartum.  Currently bleeding  no bleeding. Bowel function is normal. Bladder function is normal. Patient has not resumed intercourse. Contraception method is none. Postpartum depression screening score: 5.  Lin is not certain about future pregnancies, but is aware of return to fertility and how to space her children as she and her  see fit.  They will be travelling back to [place] for at least 2 more years, and will consider future pregnancies after this.  She declines any other birth control options or intervention other than fertility awareness method.  She is also exclusively breastfeeding and plans to use ELLIS in addition to FAM for birth control purposes.    We had a  "long discussion about concerns she raised regarding lab work that was collected at the hospital without informed consent.  We did review that standard practice is to discuss testing and recommendations prior to labs being collected, giving patient opportunity for questions to be answered.  She reflects concern that she was not asked if she wanted the labs completed, and the lab arrived while she was undergoing perineal repair.  She felt vulnerable and unable to decline the test at this time.  She also states she would prefer to have the opportunity to have a discussion with the midwife prior to the labs being drawn, as well as having the lab come at a time that she was not immediately post birth while undergoing a procedure.  Empathetic listening provided, as well as appreciation for sharing concerns.  Advised Lin that this writer will pass along her feedback to hospital.  She is also hesitant to have pap collected today as she is unsure if her insurance covers it.  Reviewed reasons for screening that is recommended, as well as alternative options should she choose to decline the HPV test.  She will discuss with insurance and follow up with CNM after decision made.     The following portions of the patient's history were reviewed and updated as appropriate: allergies, current medications and problem list    Review of Systems  General:  Denies problem  Eyes: Denies problem  Ears/Nose/Throat: Denies problem  Cardiovascular: Denies problem  Respiratory:  Denies problem  Gastrointestinal:  Denies problem, Genitourinary: Denies problem  Musculoskeletal:  Denies problem  Skin: Denies problem  Neurologic: Denies problem  Psychiatric: Denies problem  Endocrine: Denies problem  Heme/Lymphatic: Denies problem   Allergic/Immunologic: Denies problem          Objective:         Vitals:    01/21/19 1010   BP: 90/60   Pulse: 76   Weight: 147 lb (66.7 kg)   Height: 5' 8\" (1.727 m)       Physical Exam:  General Appearance: Alert, " cooperative, no distress, appears stated age  Head: Normocephalic, without obvious abnormality, atraumatic  Eyes: PERRL, conjunctiva/corneas clear, EOM's intact  Ears: Normal TM's and external ear canals, both ears  Nose: Nares normal, septum midline,mucosa normal, no drainage  Throat: Lips, mucosa, and tongue normal; teeth and gums normal  Neck: Supple, symmetrical, trachea midline, no adenopathy;  thyroid: not enlarged, symmetric, no tenderness/mass/nodules; no carotid bruit or JVD  Back: Symmetric, no curvature, ROM normal, no CVA tenderness  Lungs: Clear to auscultation bilaterally, respirations unlabored  Breasts: No breast masses, tenderness, asymmetry, or nipple discharge. Lactating bilaterally, fullness palpated in adnexa due to engorgement and need to breastfeed baby.  Heart: Regular rate and rhythm, S1 and S2 normal, no murmur, rub, or gallop  Abdomen: Soft, non-tender, bowel sounds active all four quadrants,  no masses, no organomegaly. Diastasis separation 1/2 FB wide.  Pelvic:Normally developed genitalia with no external lesions or eruptions. Vagina and cervix show no lesions, inflammation, discharge or tenderness. No cystocele, No rectocele. Uterus anteverted, involuted appropriately for 6 weeks postpartu.  No adnexal mass or tenderness.  Noted strong pelvic tone with engagement of pelvic floor.  Extremities: Extremities normal, atraumatic, no cyanosis or edema  Skin: Skin color, texture, turgor normal, no rashes or lesions  Lymph nodes: Cervical, supraclavicular, and axillary nodes normal  Neurologic: Normal

## 2021-06-26 NOTE — PROGRESS NOTES
Progress Notes by Ebony Stapleton APRN, CNM at 11/12/2018  1:40 PM     Author: Ebony Stapleton APRN, CNM Service: -- Author Type: Medical Student    Filed: 11/12/2018  9:45 PM Encounter Date: 11/12/2018 Status: Attested    : Ebony Stapleton APRN, CNM (Midwife) Cosigner: Nandini Smith APRN, CNM at 11/12/2018  9:45 PM    Attestation signed by Nandini Smith APRN, CNM at 11/12/2018  9:45 PM    Present for and agree with exam and assessment.    KAYLA Saba CNM                  Lin is here today, alone, for a routine prenatal visit at 36w3d. Endorses daily FM, denies VB, LOF, painful regular contractions. Maternal weigh gain 30lb today, appropriate based on pregavid BMI. . FH 36cm, continues to maintain consistent growth week to week. Feeling exhausted as of late as she battles an URI, finding relief from hot tea. Hoping to recovery soon so she can begin to prepare for the physicality of labor. Hgb drawn today, GBS- results discussed. KARAN Smith CNM attempted visualization of right TM following cerumen removal last week. Occluded today, unable to visualize. Encouraged to monitor s/sx of infection such as increased pain, sensitivity, discharge.

## 2021-07-14 PROBLEM — O09.30 LATE PRENATAL CARE AFFECTING PREGNANCY, ANTEPARTUM: Status: RESOLVED | Noted: 2018-09-10 | Resolved: 2018-12-08

## 2021-07-14 PROBLEM — O99.013 ANEMIA AFFECTING PREGNANCY IN THIRD TRIMESTER: Status: RESOLVED | Noted: 2018-11-12 | Resolved: 2019-01-21

## 2021-07-14 PROBLEM — O26.899 RH NEGATIVE, ANTEPARTUM: Status: RESOLVED | Noted: 2018-09-11 | Resolved: 2018-12-08

## 2021-07-14 PROBLEM — Z34.90 PREGNANT: Status: RESOLVED | Noted: 2018-12-07 | Resolved: 2018-12-08

## 2021-07-14 PROBLEM — Z67.91 RH NEGATIVE, ANTEPARTUM: Status: RESOLVED | Noted: 2018-09-11 | Resolved: 2018-12-08

## 2021-07-14 PROBLEM — Z30.8 ENCOUNTER FOR OTHER CONTRACEPTIVE MANAGEMENT: Status: RESOLVED | Noted: 2018-12-08 | Resolved: 2019-01-21

## 2021-08-22 ENCOUNTER — HEALTH MAINTENANCE LETTER (OUTPATIENT)
Age: 36
End: 2021-08-22

## 2021-08-25 ENCOUNTER — OFFICE VISIT (OUTPATIENT)
Dept: MIDWIFE SERVICES | Facility: CLINIC | Age: 36
End: 2021-08-25
Payer: COMMERCIAL

## 2021-08-25 VITALS
SYSTOLIC BLOOD PRESSURE: 88 MMHG | WEIGHT: 138 LBS | HEIGHT: 69 IN | HEART RATE: 76 BPM | BODY MASS INDEX: 20.44 KG/M2 | DIASTOLIC BLOOD PRESSURE: 62 MMHG

## 2021-08-25 DIAGNOSIS — Z01.419 WELL WOMAN EXAM WITH ROUTINE GYNECOLOGICAL EXAM: Primary | ICD-10-CM

## 2021-08-25 PROCEDURE — 99395 PREV VISIT EST AGE 18-39: CPT | Performed by: MIDWIFE

## 2021-08-25 RX ORDER — FLUTICASONE PROPIONATE 50 MCG
1 SPRAY, SUSPENSION (ML) NASAL DAILY
COMMUNITY

## 2021-08-25 ASSESSMENT — MIFFLIN-ST. JEOR: SCORE: 1367.4

## 2021-08-25 NOTE — PROGRESS NOTES
Annual Health Maintenance Exam    Subjective:     Lin is a 35 year old female who presents for an annual exam.  She is a return patient to the [facility]. She has been living in [facility] for the past few years, and returned to /[place] in February of this year. They will return to [facility] in the near future.  She reports a fairly healthy few years, with exception of dunia COVID in 2020 while in [facility].  She is in a mutually monogamous relationship with her  and they utilize NFP for family planning.  They are not currently considering expanding their family, however are not interested in long term/permanent sterilization.  She reports cycles every 30 days.  She denies any intermenstrual bleeding.  She does report some allergic rhinitis and eustachian tube dysfunction that initially started in [facility] but has noted it continues. She takes fluticasone for treatment of symptoms but she has historically taken cetirizine orally for relief of symptoms but is trying fluticasone for better relief of symptoms. She has noted some low back pain from ongoing SI joint dysfunction. She has exercises from a PT friend which she has not really been doing regularly, but she notes low back pain when doing activity that involve her pelvis.    Allergies  Patient has no known allergies.    Current Medications  Current Outpatient Medications   Medication Sig Dispense Refill     fluticasone (FLONASE) 50 MCG/ACT nasal spray Spray 1 spray into both nostrils daily       Multiple Vitamin (MULTIVITAMIN ADULT PO) Take 1 tablet by mouth daily         Past Medical/Surgical History  History reviewed. No pertinent past medical history.  Past Surgical History:   Procedure Laterality Date     ORTHOPEDIC SURGERY      right arm fracture     WISDOM TOOTH EXTRACTION         Obstetric and Gynecologic History  Patient's last menstrual period was 2021.    OB History    Para Term  AB Living    4 4 4 0 0 5   SAB TAB Ectopic Multiple Live Births   0 0 0 1 5      # Outcome Date GA Lbr Jackson/2nd Weight Sex Delivery Anes PTL Lv   4 Term 12/07/18 40w1d 02:15 4.12 kg (9 lb 1.3 oz) M Waterbirth Local N ANNIE      Name: JEROME MENDOZA-JAX      Apgar1: 8  Apgar5: 9   3 Term 11/17/15 40w0d  3.289 kg (7 lb 4 oz) M Vag-Spont None N ANNIE      Name: Osvaldo   2 Term 08/10/11 39w5d   F Vag-Spont None N ANNIE      Birth Comments: attempted waterbirth, but had thin mec      Name: Alondra      Apgar1: 9  Apgar5: 9   1A Term 03/23/09 38w0d  2.92 kg (6 lb 7 oz) M Vag-Spont None N ANNIE      Name: Katey      Apgar1: 9  Apgar5: 9   1B Term 03/23/09 38w0d  2.722 kg (6 lb) M Vag-Spont None N ANNIE      Name: Jagdeep      Apgar1: 9  Apgar5: 9       Last Pap: 2019 (cytology only, no HPV per patient request).  Results were: normal  Last mammogram: none.    Immunization History  Status updated.    Family History  Family History   Problem Relation Age of Onset     No Known Problems Daughter      No Known Problems Son      Cancer Maternal Grandmother 70.00        brain CA     Arthritis Paternal Grandmother      Cancer Paternal Grandfather 70.00        leukemia     No Known Problems Son      No Known Problems Son        Health Maintenance  Diet:  general  Regular Exercise: Yes.  walk.    Caffeine use:  yes  Sunscreen Use: Yes.   Dental Visits Regularly: Yes  Seat Belt: Yes  Self Breast Exam Monthly:No  Abuse History:  Does not have a history of abuse.  Currently does feel safe in all her relationships.      Social History  Social History     Socioeconomic History     Marital status:      Spouse name: Lamont     Number of children: 4     Years of education: College Grad     Highest education level: Not on file   Occupational History     Occupation: stay at home mom     Employer: [employer]   Tobacco Use     Smoking status: Never Smoker     Smokeless tobacco: Never Used   Substance and Sexual Activity     Alcohol use: Yes     Comment:  "occasionally     Drug use: No     Sexual activity: Yes     Partners: Male   Other Topics Concern     Parent/sibling w/ CABG, MI or angioplasty before 65F 55M? No   Social History Narrative     Not on file     Social Determinants of Health     Financial Resource Strain:      Difficulty of Paying Living Expenses:    Food Insecurity:      Worried About Running Out of Food in the Last Year:      Ran Out of Food in the Last Year:    Transportation Needs:      Lack of Transportation (Medical):      Lack of Transportation (Non-Medical):    Physical Activity:      Days of Exercise per Week:      Minutes of Exercise per Session:    Stress:      Feeling of Stress :    Social Connections:      Frequency of Communication with Friends and Family:      Frequency of Social Gatherings with Friends and Family:      Attends Zoroastrianism Services:      Active Member of Clubs or Organizations:      Attends Club or Organization Meetings:      Marital Status:    Intimate Partner Violence:      Fear of Current or Ex-Partner:      Emotionally Abused:      Physically Abused:      Sexually Abused:        Further Screening History  Colonoscopy: NA.  Lipid Profile: none.  Glucose Screen: in pregnancy.  Last Dexa: NA.    Review of Systems  A 12 point comprehensive review of systems was negative except as noted.    Objective:      [unfilled]  Vitals:    08/25/21 1034   BP: (!) 88/62   Pulse: 76   Weight: 62.6 kg (138 lb)   Height: 1.74 m (5' 8.5\")       Physical Exam:  General: Pleasant, articulate, well-groomed, well-nourished female.  Not in any apparent distress.  Neck: Supple. Mildly enlarged lymph nodes bilaterally noted   Thyroid: Small, symmetrical, no nodules noted.  Lymphadenopathy: Negative.  Lungs: Clear to auscultation bilaterally, and a nonlabored breathing pattern.  Cardio: Regular rate and rhythm, negative for murmur.   Breasts: Symmetrical, nontender, no masses, negative lymphadenopathy, negative nipple discharge.  Abdomen: " Soft, nontender, no masses, negative CVAT.  External genitalia: Normal hair distribution, no lesions.  Urethral opening: Without lesions, or tenderness.   Bladder: Without masses, or tenderness.  Vagina: Pink, rugated, normal-appearing discharge.  Cervix: posterior, pink, smooth, no lesions.  Pap smear not obtained. Declines STI screening.  Bimanual: Finds uterus small anteverted, mobile, nontender, no masses.  Negative CMT.  Adnexa: without masses or tenderness.    Lower extremities: +1 reflexes, no significant edema.      Assessment / Plan:     1) Annual health maintenance exam generally within normal limits today.  Pap smear due 2022.  2) SI joint dysfunction  3) Allergic rhinitis      1. Discussed nutrition and exercise.  Advised MVI, Vitamin D3 4000IU geltab and an omega 3 supplement daily.  Accepts the following HM labs: none indicated today.   Breast self exam technique reviewed and patient encouraged to perform self-exam monthly. Contraception: NFP.  Next pap due 2022. HPV co-testing discussed with that pap, then paps q 5 yrs if both negative.  2. Offered PT referral for ongoing SI joint dysfunction.  She declines and wants to try to incorporate PT exercises she has into daily routine to determine if this helps with discomfort. Encouraged her to call/contact CNM if she desires PT referral.  3. Continue use of fluticasone for allergic symptoms. Consider switching to cetrizine PRN if fluticasone is not effective.  4.  RTC 1 year for annual physical exam, or PRN.    40 minutes on the date of the encounter doing chart review, patient visit and documentation

## 2021-10-01 ENCOUNTER — OFFICE VISIT (OUTPATIENT)
Dept: FAMILY MEDICINE | Facility: CLINIC | Age: 36
End: 2021-10-01
Payer: COMMERCIAL

## 2021-10-01 VITALS
BODY MASS INDEX: 20.68 KG/M2 | DIASTOLIC BLOOD PRESSURE: 69 MMHG | TEMPERATURE: 98 F | WEIGHT: 138 LBS | SYSTOLIC BLOOD PRESSURE: 111 MMHG | OXYGEN SATURATION: 100 % | HEART RATE: 79 BPM

## 2021-10-01 DIAGNOSIS — H61.23 BILATERAL IMPACTED CERUMEN: Primary | ICD-10-CM

## 2021-10-01 PROCEDURE — 69210 REMOVE IMPACTED EAR WAX UNI: CPT | Mod: 50 | Performed by: NURSE PRACTITIONER

## 2021-10-01 NOTE — PROGRESS NOTES
Assessment & Plan     Bilateral impacted cerumen    - REMOVE IMPACTED CERUMEN                 Return in about 1 year (around 10/1/2022) for Physical with PCP.    KAYLA Vickers CNP  [facility] JOVANNY Ceballos is a 35 year old who presents for the following health issues     HPI     Chief Complaint   Patient presents with     Cerumen (impacted)         Review of Systems   Constitutional, HEENT, cardiovascular, pulmonary, gi and gu systems are negative, except as otherwise noted.      Objective    /69 (BP Location: Right arm, Patient Position: Sitting, Cuff Size: Adult Regular)   Pulse 79   Temp 98  F (36.7  C) (Oral)   Wt 62.6 kg (138 lb)   SpO2 100%   BMI 20.68 kg/m    Body mass index is 20.68 kg/m .  Physical Exam   GENERAL: healthy, alert and no distress  HENT: normal cephalic/atraumatic and both ears: occluded with wax and unable to remove with curette

## 2021-10-17 ENCOUNTER — HEALTH MAINTENANCE LETTER (OUTPATIENT)
Age: 36
End: 2021-10-17

## 2022-01-20 ENCOUNTER — TELEPHONE (OUTPATIENT)
Dept: FAMILY MEDICINE | Facility: CLINIC | Age: 37
End: 2022-01-20
Payer: COMMERCIAL

## 2022-01-20 DIAGNOSIS — Z23 NEED FOR IMMUNIZATION AGAINST TYPHOID: Primary | ICD-10-CM

## 2022-01-20 NOTE — TELEPHONE ENCOUNTER
Central Prior Authorization Team   Phone: [phone]      Prior Authorization Infusion/Clinic Administered Request-APPOINTMENT IS Thursday 27, 2022-PLEASE PROVIDER UPDATE TO THE PATIENT      CLINIC ADMINISTERED PRIOR AUTHORIZATION: - APPOINTMENT IS Thursday 27TH: FATHER FORWARD CALL STATING THAT HE AND HIS FAMILY HAS AN APPOINTMENT NEXT WEEK AND PER THE INSURANCE FOR THE TYPHOID VACCINE THAT WILL BE ADMINISTERED DURING APPOINTMENT-I LET HIM KNOW THAT I WILL SEND THIS REQUEST TO THE PROVIDER'S POOL TO HAVE AN ORDER(S) PLACED IN EACH OF THE CHARTS THAT THE NEED FOR A PA CAN BE ADDRESSED.     Location: [facility]   Diagnosis and ICD:  Drug/Therapy:     Previously Tried and Failed Therapies:     Date of provider note with supporting information:

## 2022-01-20 NOTE — PROGRESS NOTES
"Nurse Note      Itinerary:  [facility]      Departure Date: 3/1/2022      Return Date: TBD      Length of Trip TBD      Reason for Travel: relocation for work            Urban or rural: urban      Accommodations: Rental Home        IMMUNIZATION HISTORY  Have you received any immunizations within the past 4 weeks?  No  Have you ever fainted from having your blood drawn or from an injection?  No  Have you ever had a fever reaction to vaccination?  No  Have you ever had any bad reaction or side effect from any vaccination?  No  Have you ever had hepatitis A or B vaccine?  Yes  Do you live (or work closely) with anyone who has AIDS, an AIDS-like condition, any other immune disorder or who is on chemotherapy for cancer?  No  Do you have a family history of immunodeficiency?  No  Have you received any injection of immune globulin or any blood products during the past 12 months?  No    Patient roomed by Lora Knight is a 36 year old female seen today with family members for counsultation for international travel.   Patient will be departing in  approx 1 month month(s) and  traveling with family member(s).      Patient itinerary :  will be in the [facility] which risk for Dengue Fever, food borne illnesses and Covid. exposure.      Patient's activities will include: Work, daily living activities.    Patient's country of birth is [place]    Special medical concerns: none  Pre-travel questionnaire was completed by patient and reviewed by provider.         Vitals: /69   Pulse 67   Temp 98.3  F (36.8  C)   Ht 1.74 m (5' 8.5\")   Wt 67.2 kg (148 lb 1.6 oz)   SpO2 98%   BMI 22.19 kg/m    BMI= Body mass index is 22.19 kg/m .    EXAM:  General:  Well-nourished, well-developed in no acute distress.  Appears to be stated age, interacts appropriately and expresses understanding of information given to patient.    Current Outpatient Medications   Medication Sig Dispense Refill     " fluticasone (FLONASE) 50 MCG/ACT nasal spray Spray 1 spray into both nostrils daily       Multiple Vitamin (MULTIVITAMIN ADULT PO) Take 1 tablet by mouth daily       Patient Active Problem List   Diagnosis     CARDIOVASCULAR SCREENING; LDL GOAL LESS THAN 160     No Known Allergies      Immunizations discussed include:   Covid 19: Up to date  Hepatitis A:  Up to date  Hepatitis B: Up to date  Influenza: Declined  Not concerned about risk of disease  Typhoid: Ordered/given today, risks, benefits and side effects reviewed  Rabies: Up to date  Yellow Fever: Not indicated  Japanese Encephalitis: Up to date ( declines 3rd dose)   Meningococcus: Not indicated  Tetanus/Diphtheria: Up to date  Measles/Mumps/Rubella: Up to date  Cholera: Not needed  Polio: Not indicated  Pneumococcal: Under age of 65  Varicella: Immune by disease history per patient report  Shingrix: Not indicated  HPV:  Ordered/given today, risks, benefits and side effects reviewed     TB: low risk    ASSESSMENT/PLAN:  Lin was seen today for travel clinic.    Diagnoses and all orders for this visit:    Need for immunization against typhoid  -     TYPHOID VACCINE, IM      I have reviewed general recommendations for safe travel   including: food/water precautions, insect precautions, safer sex   practices given high prevalence of Zika,   roadway safety. Educational materials and [facility] report provided.    Malaraia prophylaxis recommended: None  Symptomatic treatment for traveler's diarrhea: declined    Personal protective measures reviewed including hand sanitizing and contact precautions for the prevention of viral illnesses. Cover coughs and masking  during travel and upon return.  Current COVID 19 pandemic.   Monitor / follow current CDC guidelines.    Country specific and CDC Covid 19  testing requirements and resources given to patient.        Evacuation insurance advised and resources were provided to patient.    Total visit time 15 minutes  with over  50% of time spent counseling patient as detailed above.    Jen Hill CNP

## 2022-01-24 NOTE — TELEPHONE ENCOUNTER
Spoke with Dede who is contacting insurance and PA team to assess PA for Typhoid.  Future order placed.  Jen Hill (Lori) CNP

## 2022-01-27 ENCOUNTER — OFFICE VISIT (OUTPATIENT)
Dept: FAMILY MEDICINE | Facility: CLINIC | Age: 37
End: 2022-01-27
Payer: COMMERCIAL

## 2022-01-27 VITALS
HEART RATE: 67 BPM | WEIGHT: 148.1 LBS | SYSTOLIC BLOOD PRESSURE: 101 MMHG | HEIGHT: 69 IN | DIASTOLIC BLOOD PRESSURE: 69 MMHG | TEMPERATURE: 98.3 F | OXYGEN SATURATION: 98 % | BODY MASS INDEX: 21.94 KG/M2

## 2022-01-27 DIAGNOSIS — Z23 NEED FOR IMMUNIZATION AGAINST TYPHOID: ICD-10-CM

## 2022-01-27 PROCEDURE — 90691 TYPHOID VACCINE IM: CPT | Mod: GA | Performed by: NURSE PRACTITIONER

## 2022-01-27 PROCEDURE — 90471 IMMUNIZATION ADMIN: CPT | Mod: GA | Performed by: NURSE PRACTITIONER

## 2022-01-27 PROCEDURE — 99401 PREV MED CNSL INDIV APPRX 15: CPT | Mod: 25 | Performed by: NURSE PRACTITIONER

## 2022-01-27 ASSESSMENT — MIFFLIN-ST. JEOR: SCORE: 1408.22

## 2022-01-27 NOTE — NURSING NOTE
Prior to immunization administration, verified patients identity using patient s name and date of birth. Please see Immunization Activity for additional information.     Screening Questionnaire for Adult Immunization    Are you sick today?   No   Do you have allergies to medications, food, a vaccine component or latex?   No   Have you ever had a serious reaction after receiving a vaccination?   No   Do you have a long-term health problem with heart disease, lung disease, asthma, kidney disease, metabolic disease (e.g. diabetes), anemia, or other blood disorder?   No   Do you have cancer, leukemia, HIV/AIDS, or any other immune system problem?   No   In the past 3 months, have you taken medications that affect  your immune system, such as prednisone, other steroids, or anticancer drugs; drugs for the treatment of rheumatoid arthritis, Crohn s disease, or psoriasis; or have you had radiation treatments?   No   Have you had a seizure, or a brain or other nervous system problem?   No   During the past year, have you received a transfusion of blood or blood     products, or been given immune (gamma) globulin or antiviral drug?   No   For women: Are you pregnant or is there a chance you could become        pregnant during the next month?   No   Have you received any vaccinations in the past 4 weeks?   No     Immunization questionnaire answers were all negative.        Per orders of Chelsie Hill NP, injection of Typhoid given by Brenda Bryant CMA. Patient instructed to remain in clinic for 15 minutes afterwards, and to report any adverse reaction to me immediately.       Screening performed by Brenda Bryant CMA on 1/27/2022 at 2:59 PM.

## 2022-01-27 NOTE — PATIENT INSTRUCTIONS
Thank you for visiting the [facility] : [phone]  Today January 27, 2022 you received the    Typhoid - injectable. This vaccine is valid for two years.       Follow up vaccine appointments can be made as a NURSE ONLY visit at the Travel Clinic, (BE PREPARED TO WAIT, ) or at designated [facility].    If you are receiving the Rabies vaccines series, it is important that you follow the exact schedule ordered.     Pre-travel     We recommend that you purchase Medical Evacuation Insurance prior to your departure.  Https://wwwnc.cdc.gov/travel/page/insurance    [place] your travel plans with the  Department of State through STEP ( Smart Traveler Enrollment Program ) https://step.state.gov.  STEP is a free service to allow U[place] citizens and nationals traveling and living abroad to enroll their trip with the nearest U.[place]. Embassy or Consulate.    Animal Exposure: Avoid all mammals even if they look healthy.  If there is a bite, scratch or even a lick, wash area immediately with soap and water for 15 minutes and seek medical care within 24 hours for evaluation of Rabies post exposure treatment.  Contact your Medical Evacuation Insurance.    COVID 19 (Sars Cov2) prevention strategies  Physical distancing: Maintain 6 foot (2m) from others.              Avoid large gatherings and public transportation.   Avoid indoor shopping malls, theaters and restaurants   Practice consistent mask wearing covering the nose, mouth and underneath the chin when unable to maintain 6 foot distance from others.  Hand washing: frequent, thorough handwashing with soap and water for 20 seconds (or using a hand  containing 60% alcohol)   Avoid touching face, nose, eyes, mouth unless you have done appropriate hand washing as above.   Clean high touch surfaces with approved disinfectant against Covid 19  (70% Ethanol ) or a bleach solution (add 20 mL (4 teaspoons) of bleach to 1 L (1  quart) of water;)  Be careful not to breath or touch bleach.      Travel Covid 19 Testing:  updated 12/06/2021  International travelers: Pre-travel: diagnostic testing (antigen or PCR) may be required for entry:  See country specific Embassy websites or airline websites.    [place] ENTRY Requirements: Effective December 6, 2021, all international arrivals to the [place] (regardless of vaccination status or citizenship status) by air are required to have a negative predeparture COVID-19 result from a test taken no more than 1 calendar day prior to departure of the flight to the [place]. Many complex scenarios may result from the 1-day rule. For example, for a flight that arrives in the [place] on a Monday, the test must have been taken no earlier than Sunday local time in the departure city. If the itinerary contains multiple flights, the test can be taken 1 day prior to departure of the first flight or can be taken en route, as long as the connecting airport is not in the [place]. If the test is unable to be taken en route, the traveler will not be able to enter the [place], or if the test is taken en route and is positive, the traveler will have to isolate in that location. If the itinerary contains 1 or more overnight stays en route to the [place], the test must have been taken 1 calendar day before the flight that will enter the [place]; however, if the itinerary has an overnight connection due to limitations in flight availability, retesting will not be required. If the first flight within an itinerary is delayed due to severe weather, aircraft mechanical issues, or other issues outside of the traveler's control, the traveler will only need to be retested if the delay causes the original test to be 24 hours or more past the 1-day window. If a connecting flight is delayed due to any of the above issues, the traveler will only need to be retested if the delay causes the original test to be 48 hours or more past the 1-day window. If a trip of less than 1  day is made out the [place], a viral test taken in the [place] may be used as a predeparture test as long as the test was taken no more than 1 day prior to rearrival in the [place]; however, if a delay occurs on the return trip and the predeparture test is out of the 1-day window, the traveler will need to be retested before returning to the [place]. Noncitizen nonimmigrants who are unvaccinated remain banned from entry    Post travel: CDC recommends getting tested 3-5 days after your trip AND stay home and self-quarantine for 7 days.      COVID-19 testing scheduling number for pre-travel through [facility]  [phone] (Must have an order). Available 24 hours a day.  You can also schedule through My Chart.     Post-travel illness:  Contact your provider or [facility] if you develop a fever, rash, cough, diarrhea or other symptoms for up to 1 year after travel.  Inform your healthcare provider when and where you traveled to.    Please call the [facility] with any questions [phone]  Or send your provider a 'My Chart' note.

## 2022-02-17 PROBLEM — Z34.80 SUPERVISION OF OTHER NORMAL PREGNANCY, ANTEPARTUM: Status: RESOLVED | Noted: 2018-09-10 | Resolved: 2018-12-08

## 2022-03-07 ENCOUNTER — LAB (OUTPATIENT)
Dept: URGENT CARE | Facility: URGENT CARE | Age: 37
End: 2022-03-07
Payer: COMMERCIAL

## 2022-03-07 DIAGNOSIS — Z20.822 ENCOUNTER FOR LABORATORY TESTING FOR COVID-19 VIRUS: ICD-10-CM

## 2022-03-07 PROCEDURE — U0005 INFEC AGEN DETEC AMPLI PROBE: HCPCS

## 2022-03-07 PROCEDURE — U0003 INFECTIOUS AGENT DETECTION BY NUCLEIC ACID (DNA OR RNA); SEVERE ACUTE RESPIRATORY SYNDROME CORONAVIRUS 2 (SARS-COV-2) (CORONAVIRUS DISEASE [COVID-19]), AMPLIFIED PROBE TECHNIQUE, MAKING USE OF HIGH THROUGHPUT TECHNOLOGIES AS DESCRIBED BY CMS-2020-01-R: HCPCS

## 2022-03-08 LAB — SARS-COV-2 RNA RESP QL NAA+PROBE: NEGATIVE

## 2022-10-02 ENCOUNTER — HEALTH MAINTENANCE LETTER (OUTPATIENT)
Age: 37
End: 2022-10-02

## 2023-10-21 ENCOUNTER — HEALTH MAINTENANCE LETTER (OUTPATIENT)
Age: 38
End: 2023-10-21

## 2024-03-09 ENCOUNTER — HEALTH MAINTENANCE LETTER (OUTPATIENT)
Age: 39
End: 2024-03-09

## 2024-10-27 ASSESSMENT — ANXIETY QUESTIONNAIRES
3. WORRYING TOO MUCH ABOUT DIFFERENT THINGS: NOT AT ALL
7. FEELING AFRAID AS IF SOMETHING AWFUL MIGHT HAPPEN: NOT AT ALL
6. BECOMING EASILY ANNOYED OR IRRITABLE: NOT AT ALL
GAD7 TOTAL SCORE: 0
7. FEELING AFRAID AS IF SOMETHING AWFUL MIGHT HAPPEN: NOT AT ALL
1. FEELING NERVOUS, ANXIOUS, OR ON EDGE: NOT AT ALL
5. BEING SO RESTLESS THAT IT IS HARD TO SIT STILL: NOT AT ALL
2. NOT BEING ABLE TO STOP OR CONTROL WORRYING: NOT AT ALL
4. TROUBLE RELAXING: NOT AT ALL
GAD7 TOTAL SCORE: 0
8. IF YOU CHECKED OFF ANY PROBLEMS, HOW DIFFICULT HAVE THESE MADE IT FOR YOU TO DO YOUR WORK, TAKE CARE OF THINGS AT HOME, OR GET ALONG WITH OTHER PEOPLE?: NOT DIFFICULT AT ALL
GAD7 TOTAL SCORE: 0
IF YOU CHECKED OFF ANY PROBLEMS ON THIS QUESTIONNAIRE, HOW DIFFICULT HAVE THESE PROBLEMS MADE IT FOR YOU TO DO YOUR WORK, TAKE CARE OF THINGS AT HOME, OR GET ALONG WITH OTHER PEOPLE: NOT DIFFICULT AT ALL

## 2024-10-28 ENCOUNTER — OFFICE VISIT (OUTPATIENT)
Dept: MIDWIFE SERVICES | Facility: CLINIC | Age: 39
End: 2024-10-28
Payer: COMMERCIAL

## 2024-10-28 VITALS
HEART RATE: 93 BPM | WEIGHT: 141.9 LBS | DIASTOLIC BLOOD PRESSURE: 72 MMHG | OXYGEN SATURATION: 97 % | SYSTOLIC BLOOD PRESSURE: 115 MMHG | HEIGHT: 69 IN | BODY MASS INDEX: 21.02 KG/M2

## 2024-10-28 DIAGNOSIS — Z01.419 WELL WOMAN EXAM: Primary | ICD-10-CM

## 2024-10-28 DIAGNOSIS — R68.89 TEMPERATURE INTOLERANCE: ICD-10-CM

## 2024-10-28 PROCEDURE — 99386 PREV VISIT NEW AGE 40-64: CPT | Performed by: MIDWIFE

## 2024-10-28 PROCEDURE — 99213 OFFICE O/P EST LOW 20 MIN: CPT | Mod: 25 | Performed by: MIDWIFE

## 2024-10-28 PROCEDURE — G0145 SCR C/V CYTO,THINLAYER,RESCR: HCPCS | Performed by: MIDWIFE

## 2024-10-28 PROCEDURE — 87624 HPV HI-RISK TYP POOLED RSLT: CPT | Performed by: MIDWIFE

## 2024-10-28 PROCEDURE — 99459 PELVIC EXAMINATION: CPT | Performed by: MIDWIFE

## 2024-10-28 NOTE — PROGRESS NOTES
Annual Health Maintenance Exam    Subjective:     Jax is a 38 year old female who presents for an annual exam.  She is an established patient to the [facility]. Jax currently lives primarily in [facility], however does return to [place] for approximately 6 months every 3 years.  Her last visit in this clinic was .  Jax and her  Lamont and 5 children (Katey and Eyal 15 yo, Alondra 12 yo, Osvaldo 10 yo, Mati 5 yo) work internationally as missionaries. Jax reports overall good health.  She reports heavy menses every 29 days, lasting for 5 days.  Her cycles have varied a bit over the past year.  She does report 2 spontaneous pregnancy losses in .  She emotionally grieves those losses and is not currently planning future pregnancies.  Her  did have a vasectomy this year.    She does report cold hands persistently this year, even when in warm climates.      Allergies  Patient has no known allergies.    Current Medications  No current outpatient medications on file.       Past Medical/Surgical History  No past medical history on file.  Past Surgical History:   Procedure Laterality Date    ORTHOPEDIC SURGERY      right arm fracture    WISDOM TOOTH EXTRACTION         Obstetric and Gynecologic History  Patient's last menstrual period was 10/09/2024 (exact date).    OB History    Para Term  AB Living   4 4 4 0 0 5   SAB IAB Ectopic Multiple Live Births   0 0 0 1 5      # Outcome Date GA Lbr Jackson/2nd Weight Sex Type Anes PTL Lv   4 Term 18 40w1d 02:15 4.12 kg (9 lb 1.3 oz) M Waterbirth Local N ANNIE      Name: REJI,MALE-JAX      Apgar1: 8  Apgar5: 9   3 Term 11/17/15 40w0d  3.289 kg (7 lb 4 oz) M Vag-Spont None N ANNIE      Name: Osvaldo   2 Term 08/10/11 39w5d   F Vag-Spont None N ANNIE      Birth Comments: attempted waterbirth, but had thin mec      Name: Alondra      Apgar1: 9  Apgar5: 9   1A Term 09 38w0d  2.92 kg (6 lb 7 oz) M Vag-Spont None N ANNIE       Name: Katey      Apgar1: 9  Apgar5: 9   1B Term 03/23/09 38w0d  2.722 kg (6 lb) M Vag-Spont None N ANNIE      Name: Jagdeep      Apgar1: 9  Apgar5: 9       Last Pap: 4/2019.  Results were: NILM, (HPV not completed)  Last mammogram: none.    Immunization History  Status updated.    Family History  Family History   Problem Relation Age of Onset    No Known Problems Daughter     No Known Problems Son     Cancer Maternal Grandmother 70.00        brain CA    Arthritis Paternal Grandmother     Cancer Paternal Grandfather 70.00        leukemia    No Known Problems Son     No Known Problems Son        Health Maintenance  Diet:  gluten free  Regular Exercise: Yes.  walking.    Caffeine use:  yes  Sunscreen Use: Yes.   Dental Visits Regularly: Yes  Seat Belt: Yes  Self Breast Exam Monthly:Yes  Abuse History:  Does not have a history of abuse.  Currently does feel safe in all her relationships.      Social History  Social History     Socioeconomic History    Marital status:      Spouse name: Lamont    Number of children: 4    Years of education: College Grad    Highest education level: Not on file   Occupational History    Occupation: stay at home mom     Employer: [employer]   Tobacco Use    Smoking status: Never    Smokeless tobacco: Never   Vaping Use    Vaping status: Never Used   Substance and Sexual Activity    Alcohol use: Yes     Comment: occasionally    Drug use: No    Sexual activity: Yes     Partners: Male   Other Topics Concern    Parent/sibling w/ CABG, MI or angioplasty before 65F 55M? No   Social History Narrative    Not on file     Social Drivers of Health     Financial Resource Strain: Not on file   Food Insecurity: Not on file   Transportation Needs: Not on file   Physical Activity: Not on file   Stress: Not on file   Social Connections: Not on file   Interpersonal Safety: Not on file   Housing Stability: Not on file       Further Screening History  Colonoscopy: NA.  Lipid Profile: none, not  "fasting today.  Glucose Screen: NA.  Last Dexa: NA.    Review of Systems  A 12 point comprehensive review of systems was negative except as noted.    Objective:        Vitals:    10/28/24 0905   BP: 115/72   Pulse: 93   SpO2: 97%   Weight: 64.4 kg (141 lb 14.4 oz)   Height: 1.74 m (5' 8.5\")       Physical Exam:  General: Pleasant, articulate, well-groomed, well-nourished female.  Not in any apparent distress.  Neck: Supple.  Thyroid: Small, symmetrical, no nodules noted.  Lymphadenopathy: Negative.  Lungs: Clear to auscultation bilaterally, and a nonlabored breathing pattern.  Cardio: Regular rate and rhythm, negative for murmur.   Breasts: Symmetrical, nontender, no masses, negative lymphadenopathy, negative nipple discharge.  Abdomen: Soft, nontender, no masses, negative CVAT.  External genitalia: Normal hair distribution, no lesions.  Urethral opening: Without lesions, or tenderness.   Bladder: Without masses, or tenderness.  Vagina: Pink, rugated, normal-appearing discharge.  Cervix: posterior, pink, smooth, no lesions.  Pap smear was obtained.  Bimanual: Finds uterus small anteverted, mobile, nontender, no masses.  Negative CMT.  Adnexa: without masses or tenderness.    Lower extremities: +1 reflexes, no significant edema.    Assessment / Plan:     1) Annual health maintenance exam generally within normal limits today.  Pap smear collected.  2) Cold hands      1. Discussed nutrition and exercise.  Advised MVI, Vitamin D3 4000IU geltab and an omega 3 supplement daily.  Accepts the following HM labs: none.   Breast self exam technique reviewed and patient encouraged to perform self-exam monthly. Contraception: vasectomy.  Next pap due today. HPV co-testing discussed with that pap, then paps q 5 yrs if both negative.  2. Reviewed causes of poor circulation and temperature intolerance.  Labs collected today to evaluate TSH, B12, Vit D, CBC. Recommendations per results of labs collected today.  3.  Mammogram ordered " for screening.   4.  RTC 1 year for annual physical exam, or PRN.    40 minutes on the date of the encounter doing chart review, interpretation of tests, patient visit, and documentation      Answers submitted by the patient for this visit:  Patient Health Questionnaire (G7) (Submitted on 10/27/2024)  VILMA 7 TOTAL SCORE: 0

## 2024-10-31 LAB
HPV HR 12 DNA CVX QL NAA+PROBE: NEGATIVE
HPV16 DNA CVX QL NAA+PROBE: NEGATIVE
HPV18 DNA CVX QL NAA+PROBE: NEGATIVE
HUMAN PAPILLOMA VIRUS FINAL DIAGNOSIS: NORMAL

## 2024-11-02 LAB
BKR AP ASSOCIATED HPV REPORT: NORMAL
BKR LAB AP GYN ADEQUACY: NORMAL
BKR LAB AP GYN INTERPRETATION: NORMAL
BKR LAB AP PREVIOUS ABNORMAL: NORMAL
PATH REPORT.COMMENTS IMP SPEC: NORMAL
PATH REPORT.COMMENTS IMP SPEC: NORMAL
PATH REPORT.RELEVANT HX SPEC: NORMAL

## 2024-12-18 ENCOUNTER — OFFICE VISIT (OUTPATIENT)
Dept: FAMILY MEDICINE | Facility: CLINIC | Age: 39
End: 2024-12-18
Payer: COMMERCIAL

## 2024-12-18 VITALS
HEIGHT: 69 IN | WEIGHT: 143 LBS | BODY MASS INDEX: 21.18 KG/M2 | RESPIRATION RATE: 16 BRPM | OXYGEN SATURATION: 99 % | SYSTOLIC BLOOD PRESSURE: 99 MMHG | TEMPERATURE: 97.2 F | DIASTOLIC BLOOD PRESSURE: 70 MMHG | HEART RATE: 83 BPM

## 2024-12-18 DIAGNOSIS — Z11.1 SCREENING EXAMINATION FOR PULMONARY TUBERCULOSIS: ICD-10-CM

## 2024-12-18 DIAGNOSIS — Z71.84 TRAVEL ADVICE ENCOUNTER: Primary | ICD-10-CM

## 2024-12-18 PROCEDURE — 99401 PREV MED CNSL INDIV APPRX 15: CPT | Mod: GA | Performed by: NURSE PRACTITIONER

## 2024-12-18 NOTE — PATIENT INSTRUCTIONS
Thank you for visiting the [facility] : [phone]  Today December 18, 2024 you received the    Typhoid - Oral. This prescription has been faxed to your pharmacy, please take as directed.    Future    Consider Ixchiq (Chikungunya )  here at [facility]    Japanese Encephalitis ( order is written, however may be completed in [place])  2 doses may be given 7 days apart.           Follow up vaccine appointments can be made as a NURSE ONLY visit at the Travel Clinic, (BE PREPARED TO WAIT, ) or at designated [facility].    If you are receiving the Rabies vaccines series, it is important that you follow the exact schedule ordered.     Pre-travel     We recommend that you purchase Medical Evacuation Insurance prior to your departure.  Https://wwwnc.cdc.gov/travel/page/insurance    [place] your travel plans with the [place] Department of [place] through STEP ( Smart Traveler Enrollment Program ) https://step.state.gov.  STEP is a free service to allow U[place] citizens and nationals traveling and living abroad to enroll their trip with the nearest U[place]S. Embassy or Consulate.    Animal Exposure: Avoid all mammals even if they look healthy.  If there is a bite, scratch or even a lick, wash area immediately with soap and water for 15 minutes and seek medical care within 24 hours for evaluation of Rabies post exposure treatment.  Contact your Medical Evacuation Insurance.    Repiratory illness prevention strategies ( Covid and Influenza ) CDC recommendations:  Consider wearing a mask in crowded or poorly ventilated indoor areas, including on public transportation and in transportation hubs.  Hand washing: frequent, thorough handwashing with soap and water for 20 seconds. Use an alcohol-based hand  with at least 60% alcohol if soap and water are not readily available. Avoid touching face, nose, eyes, mouth unless you have done appropriate hand washing as  above.  VACCINES: Staying up to date on COVID-19 vaccines significantly lowers the risk of getting very sick, being hospitalized, or dying from COVID-19. CDC recommends that everyone stay up to date on their COVID-19 vaccines, especially people with weakened immune systems.    Travel Covid 19 Testing:  updated 12/06/2021  International travelers: Pre-travel:  See country specific Embassy websites or airline websites.    Post travel: CDC recommends getting tested 3-5 days after your trip     Post-travel illness:  Contact your provider or [facility] if you develop a fever, rash, cough, diarrhea or other symptoms for up to 1 year after travel.  Inform your healthcare provider when and where you traveled to.    Please call the [facility] with any questions [phone]  Or send your provider a 'My Chart' note.

## 2024-12-18 NOTE — PROGRESS NOTES
Nurse Note ( Pre-Travel Consult)    Itinerary:  [facility]    Departure Date: 1/21/25    Return Date: June    Length of Trip unknown(They live in [facility] [place])    Reason for Travel: Visiting friends and relatives         Urban or rural: both    Accommodations: Family home        IMMUNIZATION HISTORY  Have you received any immunizations within the past 4 weeks?  No  Have you ever fainted from having your blood drawn or from an injection?  No  Have you ever had a fever reaction to vaccination?  No  Have you ever had any bad reaction or side effect from any vaccination?  No  Have you ever had hepatitis A or B vaccine?  Yes  Do you live (or work closely) with anyone who has AIDS, an AIDS-like condition, any other immune disorder or who is on chemotherapy for cancer?  No  Do you have a family history of immunodeficiency?  No  Have you received any injection of immune globulin or any blood products during the past 12 months?  No    Patient roomed by Geovany Engel  Lin Knight is a 39 year old female seen today with family members for counsultation for international travel.   Patient will be returning to [facility] in  1 month(s) and  traveling with family member(s).      Patient itinerary :  will be in the urban region of [facility] which risk for Dengue Fever, Chikungungya, Zika, Japanese Encephalitis, Rabies, food borne illnesses, motor vehicle accidents, and Typhoid. exposure.      Patient's activities will include expatriate.    Patient's country of birth is [place]    Special medical concerns: none  Pre-travel questionnaire was completed by patient and reviewed by provider.     Vitals: LMP 10/09/2024 (Exact Date)   BMI= There is no height or weight on file to calculate BMI.    EXAM:  General:  Well-nourished, well-developed in no acute distress.  Appears to be stated age, interacts appropriately and expresses understanding of information given to patient.    No current outpatient medications on file.      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  Recommend a repeat series   Meningococcus: Not indicated  Tetanus/Diphtheria: Up to date  Measles/Mumps/Rubella: Up to date  Cholera: Not needed  Polio: Not indicated  Pneumococcal: Under age of 65  Varicella: Immune by disease history per patient report  Shingrix: Not indicated  HPV:  Not indicated     TB: low risk     [place] Exposure on this trip: no  Past tolerance to Alt[place]: na    ASSESSMENT/PLAN:  Lin was seen today for travel clinic.    Diagnoses and all orders for this visit:    Travel advice encounter  -     typhoid (VIVOTIF) CR capsule; Take 1 capsule by mouth every other day.    Other orders  -     Cancel: TYPHOID VACCINE, IM  -     IXCHIQ (CHIKUNGUNYA VIRUS) LIVE VACCINE; Standing  -     Slovenian ENCEPHALITIS IM; Standing      I have reviewed general recommendations for safe travel   including: food/water precautions, insect precautions,roadway safety. Educational materials and [facility] report provided.    Malaraia prophylaxis recommended: declined  Symptomatic treatment for traveler's diarrhea:       Evacuation insurance advised and resources were provided to patient.    Total visit time 20 minutes  with over 50% of time spent counseling patient and shared decision making as detailed above.    Jen Hill CNP  Certificate in Travel Health